# Patient Record
Sex: MALE | Race: WHITE | Employment: OTHER | URBAN - METROPOLITAN AREA
[De-identification: names, ages, dates, MRNs, and addresses within clinical notes are randomized per-mention and may not be internally consistent; named-entity substitution may affect disease eponyms.]

---

## 2021-05-16 ENCOUNTER — APPOINTMENT (OUTPATIENT)
Dept: NON INVASIVE DIAGNOSTICS | Age: 71
DRG: 287 | End: 2021-05-16
Attending: STUDENT IN AN ORGANIZED HEALTH CARE EDUCATION/TRAINING PROGRAM
Payer: MEDICARE

## 2021-05-16 ENCOUNTER — APPOINTMENT (OUTPATIENT)
Dept: GENERAL RADIOLOGY | Age: 71
DRG: 287 | End: 2021-05-16
Attending: EMERGENCY MEDICINE
Payer: MEDICARE

## 2021-05-16 ENCOUNTER — HOSPITAL ENCOUNTER (INPATIENT)
Age: 71
LOS: 1 days | Discharge: HOME | DRG: 287 | End: 2021-05-17
Attending: EMERGENCY MEDICINE | Admitting: STUDENT IN AN ORGANIZED HEALTH CARE EDUCATION/TRAINING PROGRAM
Payer: MEDICARE

## 2021-05-16 DIAGNOSIS — R57.0 CARDIOGENIC SHOCK (HCC): ICD-10-CM

## 2021-05-16 DIAGNOSIS — I48.91 ATRIAL FIBRILLATION WITH RVR (HCC): ICD-10-CM

## 2021-05-16 DIAGNOSIS — I21.3 ST ELEVATION MYOCARDIAL INFARCTION (STEMI), UNSPECIFIED ARTERY (HCC): ICD-10-CM

## 2021-05-16 DIAGNOSIS — I21.09 ST ELEVATION MYOCARDIAL INFARCTION (STEMI) OF ANTERIOR WALL (HCC): Primary | ICD-10-CM

## 2021-05-16 PROBLEM — I25.10 CAD (CORONARY ARTERY DISEASE): Status: ACTIVE | Noted: 2021-05-16

## 2021-05-16 LAB
ALBUMIN SERPL-MCNC: 3.6 G/DL (ref 3.5–5)
ALBUMIN/GLOB SERPL: 1.1 {RATIO} (ref 1.1–2.2)
ALP SERPL-CCNC: 72 U/L (ref 45–117)
ALT SERPL-CCNC: 33 U/L (ref 12–78)
ANION GAP SERPL CALC-SCNC: 8 MMOL/L (ref 5–15)
APTT PPP: 27.9 SEC (ref 22.1–31)
AST SERPL-CCNC: 22 U/L (ref 15–37)
ATRIAL RATE: 133 BPM
BASOPHILS # BLD: 0 K/UL (ref 0–0.1)
BASOPHILS NFR BLD: 0 % (ref 0–1)
BILIRUB SERPL-MCNC: 0.4 MG/DL (ref 0.2–1)
BNP SERPL-MCNC: 901 PG/ML
BUN SERPL-MCNC: 20 MG/DL (ref 6–20)
BUN/CREAT SERPL: 14 (ref 12–20)
CALCIUM SERPL-MCNC: 10 MG/DL (ref 8.5–10.1)
CALCULATED R AXIS, ECG10: 52 DEGREES
CALCULATED T AXIS, ECG11: 19 DEGREES
CHLORIDE SERPL-SCNC: 98 MMOL/L (ref 97–108)
CO2 SERPL-SCNC: 27 MMOL/L (ref 21–32)
COMMENT, HOLDF: NORMAL
CREAT SERPL-MCNC: 1.45 MG/DL (ref 0.7–1.3)
DIAGNOSIS, 93000: NORMAL
DIFFERENTIAL METHOD BLD: ABNORMAL
ECHO AO ROOT DIAM: 3.06 CM
ECHO AV AREA PEAK VELOCITY: 2.1 CM2
ECHO AV AREA/BSA PEAK VELOCITY: 1 CM2/M2
ECHO AV PEAK GRADIENT: 7.65 MMHG
ECHO AV PEAK VELOCITY: 138.27 CM/S
ECHO LA AREA 4C: 22.07 CM2
ECHO LA MAJOR AXIS: 3.65 CM
ECHO LA MINOR AXIS: 1.78 CM
ECHO LA VOL 2C: 69.13 ML (ref 18–58)
ECHO LA VOL 4C: 57.86 ML (ref 18–58)
ECHO LA VOL BP: 68.73 ML (ref 18–58)
ECHO LA VOL/BSA BIPLANE: 33.44 ML/M2 (ref 16–28)
ECHO LA VOLUME INDEX A2C: 33.63 ML/M2 (ref 16–28)
ECHO LA VOLUME INDEX A4C: 28.15 ML/M2 (ref 16–28)
ECHO LV E' LATERAL VELOCITY: 9.72 CM/S
ECHO LV E' SEPTAL VELOCITY: 7.71 CM/S
ECHO LV INTERNAL DIMENSION DIASTOLIC: 4.78 CM (ref 4.2–5.9)
ECHO LV INTERNAL DIMENSION SYSTOLIC: 3.24 CM
ECHO LV IVSD: 1.41 CM (ref 0.6–1)
ECHO LV IVSS: 2.2 CM
ECHO LV MASS 2D: 327.8 G (ref 88–224)
ECHO LV MASS INDEX 2D: 159.5 G/M2 (ref 49–115)
ECHO LV POSTERIOR WALL DIASTOLIC: 1.76 CM (ref 0.6–1)
ECHO LV POSTERIOR WALL SYSTOLIC: 1.79 CM
ECHO LVOT DIAM: 2.07 CM
ECHO LVOT PEAK GRADIENT: 2.99 MMHG
ECHO LVOT PEAK VELOCITY: 86.51 CM/S
ECHO MV A VELOCITY: 63.66 CM/S
ECHO MV E DECELERATION TIME (DT): 170.24 MS
ECHO MV E VELOCITY: 98.12 CM/S
ECHO MV E/A RATIO: 1.54
ECHO MV E/E' LATERAL: 10.09
ECHO MV E/E' RATIO (AVERAGED): 11.41
ECHO MV E/E' SEPTAL: 12.73
ECHO PV MAX VELOCITY: 106.31 CM/S
ECHO PV PEAK INSTANTANEOUS GRADIENT SYSTOLIC: 4.68 MMHG
ECHO TV REGURGITANT MAX VELOCITY: 200.8 CM/S
ECHO TV REGURGITANT PEAK GRADIENT: 16.13 MMHG
EOSINOPHIL # BLD: 0 K/UL (ref 0–0.4)
EOSINOPHIL NFR BLD: 0 % (ref 0–7)
ERYTHROCYTE [DISTWIDTH] IN BLOOD BY AUTOMATED COUNT: 13.2 % (ref 11.5–14.5)
GLOBULIN SER CALC-MCNC: 3.3 G/DL (ref 2–4)
GLUCOSE SERPL-MCNC: 141 MG/DL (ref 65–100)
HCT VFR BLD AUTO: 38.4 % (ref 36.6–50.3)
HGB BLD-MCNC: 12.9 G/DL (ref 12.1–17)
IMM GRANULOCYTES # BLD AUTO: 0.1 K/UL (ref 0–0.04)
IMM GRANULOCYTES NFR BLD AUTO: 1 % (ref 0–0.5)
LYMPHOCYTES # BLD: 1.9 K/UL (ref 0.8–3.5)
LYMPHOCYTES NFR BLD: 18 % (ref 12–49)
MAGNESIUM SERPL-MCNC: 1.9 MG/DL (ref 1.6–2.4)
MCH RBC QN AUTO: 35.4 PG (ref 26–34)
MCHC RBC AUTO-ENTMCNC: 33.6 G/DL (ref 30–36.5)
MCV RBC AUTO: 105.5 FL (ref 80–99)
MONOCYTES # BLD: 1 K/UL (ref 0–1)
MONOCYTES NFR BLD: 10 % (ref 5–13)
NEUTS SEG # BLD: 7.7 K/UL (ref 1.8–8)
NEUTS SEG NFR BLD: 71 % (ref 32–75)
NRBC # BLD: 0 K/UL (ref 0–0.01)
NRBC BLD-RTO: 0 PER 100 WBC
PLATELET # BLD AUTO: 231 K/UL (ref 150–400)
PMV BLD AUTO: 9.1 FL (ref 8.9–12.9)
POTASSIUM SERPL-SCNC: 3.2 MMOL/L (ref 3.5–5.1)
PROT SERPL-MCNC: 6.9 G/DL (ref 6.4–8.2)
Q-T INTERVAL, ECG07: 298 MS
QRS DURATION, ECG06: 162 MS
QTC CALCULATION (BEZET), ECG08: 497 MS
RBC # BLD AUTO: 3.64 M/UL (ref 4.1–5.7)
SAMPLES BEING HELD,HOLD: NORMAL
SODIUM SERPL-SCNC: 133 MMOL/L (ref 136–145)
THERAPEUTIC RANGE,PTTT: NORMAL SECS (ref 58–77)
TROPONIN I BLD-MCNC: <0.04 NG/ML (ref 0–0.08)
TROPONIN I SERPL-MCNC: 4.28 NG/ML
VENTRICULAR RATE, ECG03: 167 BPM
WBC # BLD AUTO: 10.7 K/UL (ref 4.1–11.1)

## 2021-05-16 PROCEDURE — 77030004549 HC CATH ANGI DX PRF MRTM -A: Performed by: STUDENT IN AN ORGANIZED HEALTH CARE EDUCATION/TRAINING PROGRAM

## 2021-05-16 PROCEDURE — 77030028837 HC SYR ANGI PWR INJ COEU -A: Performed by: STUDENT IN AN ORGANIZED HEALTH CARE EDUCATION/TRAINING PROGRAM

## 2021-05-16 PROCEDURE — 93458 L HRT ARTERY/VENTRICLE ANGIO: CPT | Performed by: STUDENT IN AN ORGANIZED HEALTH CARE EDUCATION/TRAINING PROGRAM

## 2021-05-16 PROCEDURE — 77030008543 HC TBNG MON PRSS MRTM -A: Performed by: STUDENT IN AN ORGANIZED HEALTH CARE EDUCATION/TRAINING PROGRAM

## 2021-05-16 PROCEDURE — 74011000250 HC RX REV CODE- 250: Performed by: STUDENT IN AN ORGANIZED HEALTH CARE EDUCATION/TRAINING PROGRAM

## 2021-05-16 PROCEDURE — 74011250637 HC RX REV CODE- 250/637: Performed by: STUDENT IN AN ORGANIZED HEALTH CARE EDUCATION/TRAINING PROGRAM

## 2021-05-16 PROCEDURE — 84484 ASSAY OF TROPONIN QUANT: CPT

## 2021-05-16 PROCEDURE — C1769 GUIDE WIRE: HCPCS | Performed by: STUDENT IN AN ORGANIZED HEALTH CARE EDUCATION/TRAINING PROGRAM

## 2021-05-16 PROCEDURE — 77030019698 HC SYR ANGI MDLON MRTM -A: Performed by: STUDENT IN AN ORGANIZED HEALTH CARE EDUCATION/TRAINING PROGRAM

## 2021-05-16 PROCEDURE — 74011000636 HC RX REV CODE- 636: Performed by: STUDENT IN AN ORGANIZED HEALTH CARE EDUCATION/TRAINING PROGRAM

## 2021-05-16 PROCEDURE — 4A023N7 MEASUREMENT OF CARDIAC SAMPLING AND PRESSURE, LEFT HEART, PERCUTANEOUS APPROACH: ICD-10-PCS | Performed by: STUDENT IN AN ORGANIZED HEALTH CARE EDUCATION/TRAINING PROGRAM

## 2021-05-16 PROCEDURE — 93005 ELECTROCARDIOGRAM TRACING: CPT

## 2021-05-16 PROCEDURE — 85025 COMPLETE CBC W/AUTO DIFF WBC: CPT

## 2021-05-16 PROCEDURE — 77030019569 HC BND COMPR RAD TERU -B: Performed by: STUDENT IN AN ORGANIZED HEALTH CARE EDUCATION/TRAINING PROGRAM

## 2021-05-16 PROCEDURE — 36415 COLL VENOUS BLD VENIPUNCTURE: CPT

## 2021-05-16 PROCEDURE — C1876 STENT, NON-COA/NON-COV W/DEL: HCPCS | Performed by: STUDENT IN AN ORGANIZED HEALTH CARE EDUCATION/TRAINING PROGRAM

## 2021-05-16 PROCEDURE — 93306 TTE W/DOPPLER COMPLETE: CPT

## 2021-05-16 PROCEDURE — 99152 MOD SED SAME PHYS/QHP 5/>YRS: CPT | Performed by: STUDENT IN AN ORGANIZED HEALTH CARE EDUCATION/TRAINING PROGRAM

## 2021-05-16 PROCEDURE — 85730 THROMBOPLASTIN TIME PARTIAL: CPT

## 2021-05-16 PROCEDURE — B2111ZZ FLUOROSCOPY OF MULTIPLE CORONARY ARTERIES USING LOW OSMOLAR CONTRAST: ICD-10-PCS | Performed by: STUDENT IN AN ORGANIZED HEALTH CARE EDUCATION/TRAINING PROGRAM

## 2021-05-16 PROCEDURE — 77030010221 HC SPLNT WR POS TELE -B: Performed by: STUDENT IN AN ORGANIZED HEALTH CARE EDUCATION/TRAINING PROGRAM

## 2021-05-16 PROCEDURE — 92960 CARDIOVERSION ELECTRIC EXT: CPT

## 2021-05-16 PROCEDURE — 99152 MOD SED SAME PHYS/QHP 5/>YRS: CPT

## 2021-05-16 PROCEDURE — 77030015766: Performed by: STUDENT IN AN ORGANIZED HEALTH CARE EDUCATION/TRAINING PROGRAM

## 2021-05-16 PROCEDURE — 96374 THER/PROPH/DIAG INJ IV PUSH: CPT

## 2021-05-16 PROCEDURE — 83735 ASSAY OF MAGNESIUM: CPT

## 2021-05-16 PROCEDURE — 74011000250 HC RX REV CODE- 250

## 2021-05-16 PROCEDURE — 74011250636 HC RX REV CODE- 250/636: Performed by: STUDENT IN AN ORGANIZED HEALTH CARE EDUCATION/TRAINING PROGRAM

## 2021-05-16 PROCEDURE — 80053 COMPREHEN METABOLIC PANEL: CPT

## 2021-05-16 PROCEDURE — 83880 ASSAY OF NATRIURETIC PEPTIDE: CPT

## 2021-05-16 PROCEDURE — 65660000000 HC RM CCU STEPDOWN

## 2021-05-16 PROCEDURE — 99285 EMERGENCY DEPT VISIT HI MDM: CPT

## 2021-05-16 PROCEDURE — 99153 MOD SED SAME PHYS/QHP EA: CPT

## 2021-05-16 PROCEDURE — 71045 X-RAY EXAM CHEST 1 VIEW: CPT

## 2021-05-16 RX ORDER — HEPARIN SODIUM 5000 [USP'U]/ML
4000 INJECTION, SOLUTION INTRAVENOUS; SUBCUTANEOUS AS NEEDED
Status: DISCONTINUED | OUTPATIENT
Start: 2021-05-16 | End: 2021-05-16

## 2021-05-16 RX ORDER — FENTANYL CITRATE 50 UG/ML
INJECTION, SOLUTION INTRAMUSCULAR; INTRAVENOUS
Status: DISPENSED
Start: 2021-05-16 | End: 2021-05-16

## 2021-05-16 RX ORDER — HEPARIN SODIUM 200 [USP'U]/100ML
INJECTION, SOLUTION INTRAVENOUS
Status: COMPLETED | OUTPATIENT
Start: 2021-05-16 | End: 2021-05-16

## 2021-05-16 RX ORDER — HEPARIN SODIUM 10000 [USP'U]/100ML
10-25 INJECTION, SOLUTION INTRAVENOUS
Status: DISCONTINUED | OUTPATIENT
Start: 2021-05-16 | End: 2021-05-16

## 2021-05-16 RX ORDER — KETAMINE HYDROCHLORIDE 10 MG/ML
INJECTION, SOLUTION INTRAMUSCULAR; INTRAVENOUS
Status: COMPLETED
Start: 2021-05-16 | End: 2021-05-16

## 2021-05-16 RX ORDER — SODIUM CHLORIDE 0.9 % (FLUSH) 0.9 %
5-40 SYRINGE (ML) INJECTION EVERY 8 HOURS
Status: DISCONTINUED | OUTPATIENT
Start: 2021-05-16 | End: 2021-05-17 | Stop reason: HOSPADM

## 2021-05-16 RX ORDER — METOPROLOL SUCCINATE 50 MG/1
100 TABLET, EXTENDED RELEASE ORAL
Status: DISCONTINUED | OUTPATIENT
Start: 2021-05-16 | End: 2021-05-17 | Stop reason: HOSPADM

## 2021-05-16 RX ORDER — FENTANYL CITRATE 50 UG/ML
INJECTION, SOLUTION INTRAMUSCULAR; INTRAVENOUS AS NEEDED
Status: DISCONTINUED | OUTPATIENT
Start: 2021-05-16 | End: 2021-05-16 | Stop reason: HOSPADM

## 2021-05-16 RX ORDER — ATORVASTATIN CALCIUM 20 MG/1
20 TABLET, FILM COATED ORAL DAILY
COMMUNITY

## 2021-05-16 RX ORDER — DILTIAZEM HYDROCHLORIDE 5 MG/ML
INJECTION INTRAVENOUS
Status: COMPLETED
Start: 2021-05-16 | End: 2021-05-16

## 2021-05-16 RX ORDER — VERAPAMIL HYDROCHLORIDE 2.5 MG/ML
INJECTION, SOLUTION INTRAVENOUS AS NEEDED
Status: DISCONTINUED | OUTPATIENT
Start: 2021-05-16 | End: 2021-05-16 | Stop reason: HOSPADM

## 2021-05-16 RX ORDER — SODIUM CHLORIDE 0.9 % (FLUSH) 0.9 %
5-40 SYRINGE (ML) INJECTION AS NEEDED
Status: DISCONTINUED | OUTPATIENT
Start: 2021-05-16 | End: 2021-05-17 | Stop reason: HOSPADM

## 2021-05-16 RX ORDER — ATORVASTATIN CALCIUM 10 MG/1
20 TABLET, FILM COATED ORAL
Status: DISCONTINUED | OUTPATIENT
Start: 2021-05-16 | End: 2021-05-17 | Stop reason: HOSPADM

## 2021-05-16 RX ORDER — AMLODIPINE BESYLATE 5 MG/1
5 TABLET ORAL DAILY
Status: DISCONTINUED | OUTPATIENT
Start: 2021-05-16 | End: 2021-05-16

## 2021-05-16 RX ORDER — AMLODIPINE BESYLATE 5 MG/1
5 TABLET ORAL
Status: DISCONTINUED | OUTPATIENT
Start: 2021-05-16 | End: 2021-05-17 | Stop reason: HOSPADM

## 2021-05-16 RX ORDER — KETAMINE HYDROCHLORIDE 50 MG/ML
50 INJECTION, SOLUTION INTRAMUSCULAR; INTRAVENOUS ONCE
Status: DISCONTINUED | OUTPATIENT
Start: 2021-05-16 | End: 2021-05-16

## 2021-05-16 RX ORDER — FENTANYL CITRATE 50 UG/ML
50 INJECTION, SOLUTION INTRAMUSCULAR; INTRAVENOUS ONCE
Status: DISCONTINUED | OUTPATIENT
Start: 2021-05-16 | End: 2021-05-16

## 2021-05-16 RX ORDER — HEPARIN SODIUM 5000 [USP'U]/ML
4000 INJECTION, SOLUTION INTRAVENOUS; SUBCUTANEOUS ONCE
Status: DISCONTINUED | OUTPATIENT
Start: 2021-05-16 | End: 2021-05-16

## 2021-05-16 RX ORDER — DILTIAZEM HYDROCHLORIDE 5 MG/ML
15 INJECTION INTRAVENOUS
Status: COMPLETED | OUTPATIENT
Start: 2021-05-16 | End: 2021-05-16

## 2021-05-16 RX ORDER — METOPROLOL SUCCINATE 50 MG/1
100 TABLET, EXTENDED RELEASE ORAL DAILY
Status: DISCONTINUED | OUTPATIENT
Start: 2021-05-16 | End: 2021-05-16

## 2021-05-16 RX ORDER — SODIUM CHLORIDE 9 MG/ML
75 INJECTION, SOLUTION INTRAVENOUS CONTINUOUS
Status: DISPENSED | OUTPATIENT
Start: 2021-05-16 | End: 2021-05-16

## 2021-05-16 RX ORDER — LIDOCAINE HYDROCHLORIDE 10 MG/ML
INJECTION, SOLUTION EPIDURAL; INFILTRATION; INTRACAUDAL; PERINEURAL AS NEEDED
Status: DISCONTINUED | OUTPATIENT
Start: 2021-05-16 | End: 2021-05-16 | Stop reason: HOSPADM

## 2021-05-16 RX ORDER — HEPARIN SODIUM 5000 [USP'U]/ML
2000 INJECTION, SOLUTION INTRAVENOUS; SUBCUTANEOUS AS NEEDED
Status: DISCONTINUED | OUTPATIENT
Start: 2021-05-16 | End: 2021-05-16

## 2021-05-16 RX ADMIN — DILTIAZEM HYDROCHLORIDE 15 MG: 5 INJECTION INTRAVENOUS at 02:03

## 2021-05-16 RX ADMIN — ATORVASTATIN CALCIUM 20 MG: 10 TABLET, FILM COATED ORAL at 22:22

## 2021-05-16 RX ADMIN — ATORVASTATIN CALCIUM 20 MG: 10 TABLET, FILM COATED ORAL at 05:02

## 2021-05-16 RX ADMIN — Medication 40 ML: at 10:00

## 2021-05-16 RX ADMIN — METOPROLOL SUCCINATE 100 MG: 50 TABLET, EXTENDED RELEASE ORAL at 17:01

## 2021-05-16 RX ADMIN — SODIUM CHLORIDE 75 ML/HR: 9 INJECTION, SOLUTION INTRAVENOUS at 04:32

## 2021-05-16 RX ADMIN — KETAMINE HYDROCHLORIDE 50 MG: 10 INJECTION, SOLUTION INTRAMUSCULAR; INTRAVENOUS at 01:57

## 2021-05-16 RX ADMIN — RIVAROXABAN 20 MG: 20 TABLET, FILM COATED ORAL at 17:01

## 2021-05-16 RX ADMIN — Medication 10 ML: at 22:22

## 2021-05-16 RX ADMIN — AMLODIPINE BESYLATE 5 MG: 5 TABLET ORAL at 17:01

## 2021-05-16 RX ADMIN — Medication 40 ML: at 14:00

## 2021-05-16 NOTE — PROGRESS NOTES
0700 Bedside and Verbal shift change report given to Alf Owusu (oncoming nurse) by Leeroy Gaines (offgoing nurse). Report included the following information SBAR, Kardex, ED Summary, Procedure Summary, Intake/Output, MAR, Recent Results and Cardiac Rhythm NSR.   0800 pt assessed per flowsheet, TR bans down, site CDI, removed and DSD applied. Reviewed plan of care  0920 pt up to chair, orthostatic BP negative. Breakfast provided and christy well. Labs collected previously and resulted. Trop 4.28, I have paged Dr Kash Menendez with this result and to verify schedule of pt's meds ( he takes them in the evening around 6- here scheduled daily at 9A)  1030 pt back to bed for echo at bedside  1200 pt reassessed per flowsheet, denies pain or SOB, cath site CDI. Pt amb in room with steady gait, christy well. 1230 TRANSFER - OUT REPORT:    Verbal report given to Yvette(name) on Ben Nova  being transferred to IVCU(unit) for routine progression of care       Report consisted of patients Situation, Background, Assessment and   Recommendations(SBAR). Information from the following report(s) SBAR, Kardex, ED Summary, Procedure Summary, Intake/Output, MAR, Recent Results and Cardiac Rhythm NSR was reviewed with the receiving nurse.     Lines:   Peripheral IV 05/16/21 Right Forearm (Active)   Site Assessment Clean, dry, & intact 05/16/21 0800   Phlebitis Assessment 0 05/16/21 0800   Infiltration Assessment 0 05/16/21 0800   Dressing Status Clean, dry, & intact 05/16/21 0800   Dressing Type Transparent;Tape 05/16/21 0800   Hub Color/Line Status Green;Flushed;Capped 05/16/21 0800   Action Taken Open ports on tubing capped 05/16/21 0800   Alcohol Cap Used Yes 05/16/21 0800       Peripheral IV 05/16/21 Left Antecubital (Active)   Site Assessment Clean, dry, & intact 05/16/21 0800   Phlebitis Assessment 0 05/16/21 0800   Infiltration Assessment 0 05/16/21 0800   Dressing Status Clean, dry, & intact 05/16/21 0800   Dressing Type Transparent;Tape 05/16/21 0800   Hub Color/Line Status Pink;Capped 05/16/21 0800   Action Taken Open ports on tubing capped 05/16/21 0800   Alcohol Cap Used Yes 05/16/21 0800        Opportunity for questions and clarification was provided. Patient transported with:   Monitor  Registered Nurse   Home meds rescheduled to pt's normal schedule.

## 2021-05-16 NOTE — ED PROVIDER NOTES
EMERGENCY DEPARTMENT HISTORY AND PHYSICAL EXAM    Please note that this dictation was completed with [employer], the computer voice recognition software. Quite often unanticipated grammatical, syntax, homophones, and other interpretive errors are inadvertently transcribed by the computer software. Please disregard these errors. Please excuse any errors that have escaped final proofreading. Date: 5/16/2021  Patient Name: Molli Barthel  Patient Age and Sex: 79 y.o. male    History of Presenting Illness     Chief Complaint   Patient presents with    Irregular Heart Beat    Chest Pain       History Provided By: Patient and patient' son    HPI: Molli Barthel, is a 79 y.o. male whose medical history is noted below and includes cad, afib, presents to the ED via ambulance with crushing substernal cp that started 30-45 min prior to ED arrival. Pain started at rest, crushing, 8-10/10, constant. Not radiating. Associated with mild sob. Received 325mg asa and nitro by ems, nitro improved the cp only slightly. En route and on arrival to the ED, patient's HR is in the 160s, BP normal.  Denies abd pain, nausea, vomiting, cough, fever or chills. Patient immediately placed on monitor and on pads due to severity of cp, diaphoresis and concern for impending hemodynamic instability. Pt denies any other alleviating or exacerbating factors. No other associated signs or symptoms. There are no other complaints, changes or physical findings at this time.      PCP: Tammie Hugo MD    Past History   All documented elements of the [address] reviewed and verified by me. -Luciana Saint, MD    Past Medical History:  Past Medical History:   Diagnosis Date    Arthritis     Chronic pain     chronic back pain    High cholesterol     Hypertension     Obesity        Past Surgical History:  Past Surgical History:   Procedure Laterality Date    HX GI  2007    colonscopy    HX ORTHOPAEDIC  2008    lumbar dorantes/fusion    NEUROLOGICAL PROCEDURE UNLISTED  2002    brain cyst-3rd ventricle    SHOULDER SURG PROC UNLISTED  1960s    shoulder separation       Family History:  Family History   Problem Relation Age of Onset   [facility] Hypertension Mother     Stroke Mother         TIA X4    Cancer Father         prostate ca    Hypertension Father        Social History:  Social History     Tobacco Use    Smoking status: Current Every Day Smoker     Packs/day: 0.50     Years: 40.00     Pack years: 20.00   Substance Use Topics    Alcohol use: Yes     Comment: occasional    Drug use: Yes     Types: Prescription       Allergies:  No Known Allergies    Review of Systems   All other systems reviewed and negative    Review of Systems   Constitutional: Negative for chills and fever. Eyes: Negative. Respiratory: Positive for shortness of breath. Negative for cough and wheezing. Cardiovascular: Positive for chest pain and palpitations. Gastrointestinal: Negative for abdominal pain, blood in stool, nausea and vomiting. Genitourinary: Negative for flank pain and hematuria. Musculoskeletal: Negative for back pain and neck pain. Skin: Positive for pallor. Negative for rash. Neurological: Positive for light-headedness. Negative for seizures and syncope. Hematological: Negative for adenopathy. Bruises/bleeds easily. Psychiatric/Behavioral: Positive for agitation. Negative for confusion. All other systems reviewed and are negative. Physical Exam   Reviewed patients vital signs and nursing note    Physical Exam  Vitals signs and nursing note reviewed. Constitutional:       General: He is awake. He is in acute distress. Appearance: He is obese. He is ill-appearing and diaphoretic. HENT:      Head: Normocephalic and atraumatic. Eyes:      Extraocular Movements: Extraocular movements intact. Pupils: Pupils are equal, round, and reactive to light. Neck:      Musculoskeletal: Neck supple. Vascular: No JVD.       Trachea: No tracheal deviation. Cardiovascular:      Rate and Rhythm: Regular rhythm. Tachycardia present. Pulses:           Radial pulses are 2+ on the right side and 2+ on the left side. Heart sounds: Normal heart sounds. Pulmonary:      Effort: Pulmonary effort is normal. No accessory muscle usage. Breath sounds: Normal breath sounds. Chest:      Chest wall: No deformity, tenderness or crepitus. Abdominal:      Palpations: Abdomen is soft. There is no mass. Tenderness: There is no abdominal tenderness. Musculoskeletal: Normal range of motion. Right lower leg: He exhibits no tenderness. No edema. Left lower leg: He exhibits no tenderness. No edema. Skin:     Capillary Refill: Capillary refill takes 2 to 3 seconds. Coloration: Skin is pale. Skin is not cyanotic. Findings: No erythema or rash. Neurological:      General: No focal deficit present. Mental Status: He is oriented to person, place, and time. Psychiatric:         Mood and Affect: Mood is anxious. Behavior: Behavior normal.         Diagnostic Study Results     Labs - I have personally reviewed and interpreted all laboratory results. Janeth Nieto MD, MSc  Recent Results (from the past 24 hour(s))   EKG, 12 LEAD, INITIAL    Collection Time: 05/16/21  1:43 AM   Result Value Ref Range    Ventricular Rate 167 BPM    Atrial Rate 133 BPM    QRS Duration 162 ms    Q-T Interval 298 ms    QTC Calculation (Bezet) 497 ms    Calculated R Axis 52 degrees    Calculated T Axis 19 degrees    Diagnosis       Wide QRS tachycardia with premature supraventricular complexes and with   frequent premature ventricular complexes  Nonspecific intraventricular block  When compared with ECG of 20-OCT-2008 16:53,  Wide QRS tachycardia has replaced Sinus rhythm  Vent.  rate has increased BY  83 BPM     POC TROPONIN-I    Collection Time: 05/16/21  1:51 AM   Result Value Ref Range    Troponin-I (POC) <0.04 0.00 - 0.08 ng/mL   CBC WITH AUTOMATED DIFF    Collection Time: 05/16/21  1:52 AM   Result Value Ref Range    WBC 10.7 4.1 - 11.1 K/uL    RBC 3.64 (L) 4.10 - 5.70 M/uL    HGB 12.9 12.1 - 17.0 g/dL    HCT 38.4 36.6 - 50.3 %    .5 (H) 80.0 - 99.0 FL    MCH 35.4 (H) 26.0 - 34.0 PG    MCHC 33.6 30.0 - 36.5 g/dL    RDW 13.2 11.5 - 14.5 %    PLATELET 783 309 - 045 K/uL    MPV 9.1 8.9 - 12.9 FL    NRBC 0.0 0  WBC    ABSOLUTE NRBC 0.00 0.00 - 0.01 K/uL    NEUTROPHILS 71 32 - 75 %    LYMPHOCYTES 18 12 - 49 %    MONOCYTES 10 5 - 13 %    EOSINOPHILS 0 0 - 7 %    BASOPHILS 0 0 - 1 %    IMMATURE GRANULOCYTES 1 (H) 0.0 - 0.5 %    ABS. NEUTROPHILS 7.7 1.8 - 8.0 K/UL    ABS. LYMPHOCYTES 1.9 0.8 - 3.5 K/UL    ABS. MONOCYTES 1.0 0.0 - 1.0 K/UL    ABS. EOSINOPHILS 0.0 0.0 - 0.4 K/UL    ABS. BASOPHILS 0.0 0.0 - 0.1 K/UL    ABS. IMM. GRANS. 0.1 (H) 0.00 - 0.04 K/UL    DF AUTOMATED     METABOLIC PANEL, COMPREHENSIVE    Collection Time: 05/16/21  1:52 AM   Result Value Ref Range    Sodium 133 (L) 136 - 145 mmol/L    Potassium 3.2 (L) 3.5 - 5.1 mmol/L    Chloride 98 97 - 108 mmol/L    CO2 27 21 - 32 mmol/L    Anion gap 8 5 - 15 mmol/L    Glucose 141 (H) 65 - 100 mg/dL    BUN 20 6 - 20 MG/DL    Creatinine 1.45 (H) 0.70 - 1.30 MG/DL    BUN/Creatinine ratio 14 12 - 20      GFR est AA 58 (L) >60 ml/min/1.73m2    GFR est non-AA 48 (L) >60 ml/min/1.73m2    Calcium 10.0 8.5 - 10.1 MG/DL    Bilirubin, total 0.4 0.2 - 1.0 MG/DL    ALT (SGPT) 33 12 - 78 U/L    AST (SGOT) 22 15 - 37 U/L    Alk.  phosphatase 72 45 - 117 U/L    Protein, total 6.9 6.4 - 8.2 g/dL    Albumin 3.6 3.5 - 5.0 g/dL    Globulin 3.3 2.0 - 4.0 g/dL    A-G Ratio 1.1 1.1 - 2.2     NT-PRO BNP    Collection Time: 05/16/21  1:52 AM   Result Value Ref Range    NT pro- (H) <125 PG/ML   MAGNESIUM    Collection Time: 05/16/21  1:52 AM   Result Value Ref Range    Magnesium 1.9 1.6 - 2.4 mg/dL   SAMPLES BEING HELD    Collection Time: 05/16/21  1:54 AM   Result Value Ref Range    SAMPLES BEING HELD RD     COMMENT        Add-on orders for these samples will be processed based on acceptable specimen integrity and analyte stability, which may vary by analyte. PTT    Collection Time: 05/16/21  2:41 AM   Result Value Ref Range    aPTT 27.9 22.1 - 31.0 sec    aPTT, therapeutic range     58.0 - 77.0 SECS   TROPONIN I    Collection Time: 05/16/21  8:10 AM   Result Value Ref Range    Troponin-I, Qt. 4.28 (H) <0.05 ng/mL       Radiologic Studies - I have personally reviewed and interpreted all imaging studies and agree with radiology interpretation and report. - Mohini Salgado MD, MSc  XR CHEST PORT   Final Result   No acute changes. Medical Decision Making   I am the first provider for this patient. Records Reviewed: I reviewed our electronic medical record system for any past medical records that were available that may contribute to the patient's current condition, including their PMH, surgical history, social and family history. Reviewed the nursing notes and vital signs from today's visit. Nursing notes will be reviewed as they become available in realtime while the pt has been in the ED. In addition, I read most recent discharge summaries, if available and reviewed prior ECGs or imaging studies for comparison purposes. Mohini Salgado MD Msc    Vital Signs-Reviewed the patient's vital signs. ECG interpretation 1:43am: wide complex irregular tachycardia, rate 167, multiple PVCs, nonspecific ST changes concerning for underlying ischemia. ecg c/w afib with rvr and underlying intraventricular block. No prior ecg available. This ECG has been viewed and interpreted by me personally. Mohini Salgado MD, Msc  ECG interpretation 2:00am: sinus rhythm, rate 83, PACs, ST elevations in aVR and v1 with aVR>V1, reciprocal depressions in v3-v6. Concerning for LAD lesion and c/w STEMI. This ECG has been viewed and interpreted by me personally.  Mohini Salgado MD, Msc    Provider Notes (Medical Decision Making):     74NI male with history of CAD and atrial fibrillation presents via ambulance with crushing substernal cp, 10/10, started within the hour prior to arrival and while patient was at rest.  On arrival to the ED, patient was in unstable atrial fibrillation with rvr. Rates in 160s, patient diaphoretic, persistent cp despite having nitro and 325mg asa prior to arrival, normotensive initially. Pads placed, attempted diltiazem 15mgx1 without improvement. Patient shortly after deteriorated further clinically, became diaphoretic, hypotensive with sbp in 80s, poor cap refill. Concerning for cardiogenic shock. Promptly cardioverted using ketamine IV for sedation prior to cardioversion. Cardioversion with 200J was successful on first attempt. ECG post cardioversion obtained immediately and concerning for STEMI, specifically LAD lesion given the st elevations in avR with reciprocal depressions. Cath lab activated. Discussed case with Dr. Landry Speaker from cardiology, reviewed ecg findings and patient's presenting history as well as what transpired in the ED. Agreement on cath lab activation. Heparin started. Pain persists but is improving. Plan is to get him to cath lab as soon as possible. Hemodynamically, patient remained stable after cardioversion and up until transfer to cath lab. Of note, I interviewed the patient's son after the patient was stabilized and in NSR. According to the son, the patient had been heavily drinking this evening. His wife  a few months ago and he has been drinking a significant amount ever since. He was at [facility] about 4 or 5 years ago with similar cp, was found to be in afib with rvr there also. Son states that he believes a cath at that time was done and showed no significant stenosis. Procedure Note - External Cardioversion:   1:55 AM  Performed by alejandrina .    Cardioversion was indicated for a rhythm of unstable tachycardia and performed 1 times with a maximum delivered energy of 200 joules,  biphasic. Adequate procedural sedation was attained, see moderate sedation record. Patient's rhythm was normal sinus rhythm at the end of the procedure. The procedure took 1-15 minutes, and pt tolerated well. Procedure Note - Procedural Sedation 1:50am:     Indication:  Procedural sedation is required to perform the following procedure:  External cardioversion for unstable atrial fibrillation    Informed Consent:  The reason for the procedure as well as the risks, benefits, and alternatives to the procedure have been explained to the patient and He verbally consents to the procedure. Written consent not obtained due to emergent nature of procedure. Anesthesia Risks: The ASA score is ASA 3 - Severe systemic disease but compensated . The patient is having all vital signs monitored by an RN as well as pulse oximetry. Mallampati Score Reference: The patient has a patent airway with a Mallampati Classification Score of III (soft palate, base of uvula visible). Pre-Procedure Sedation Assessment:  Sedation Start Time: 1:50AM  Time Out was performed to confirm patient identity, laterality and indication prior to procedural sedation. Post Procedure Sedation Assessment:   Sedation End Time: 2:13am    The patient was sedated with a total of 50mg Ketamine via IV. Reversal agents and resuscitation equipment were at the bedside. The cardioversion was done and the patient tolerated the procedure well with no complications. Reversal agents were not used. Patient tolerated procedure well. Complications: None     Jeni Hanson MD    Procedure Note - Bedside Ultrasound:  1:41 AM  Performed by: alejandrina  Echo performed at beside, showing no pericardial effusion, rv>lv, EF not severely reduced, aortic outflow tract normal diameter. The procedure took 1-15 minutes, and pt tolerated well. ED Course:   Initial assessment performed.  The patients presenting problems have been discussed, and they are in agreement with the care plan formulated and outlined with them. I have encouraged them to ask questions as they arise throughout their visit. Consult Note:  Stephenie Rodgers MD spoke with  Dr. Rosezella Carrel (cardiology),   Discussed pt's hx, physical exam and available diagnostic and imaging results. Reviewed care plans. Agree with management and plan thus far. Dispo: to cath lab    DISPOSITION: ADMIT  Patient is being admitted to the hospital.  Their test results and reasons for admission have been discussed. The patient and/or available family express agreement with and understanding of the need to be admitted and their admission diagnosis. Thank you for resuming the care of this patient. Please don't hesitate to contact me in the emergency department if you  have any additional questions. Stephenie Rodgers MD, MSc    CRITICAL CARE NOTE :    IMPENDING DETERIORATION -Cardiovascular  ASSOCIATED RISK FACTORS - Hypotension, Shock and Dysrhythmia  MANAGEMENT- Bedside Assessment and Supervision of Care  INTERPRETATION -  Xrays, ECG, Blood Pressure and Cardiac Output Measures   INTERVENTIONS - hemodynamic mngmt and cardioversion  CASE REVIEW - Medical Sub-Specialist, Nursing and Family  TREATMENT RESPONSE -Improved  PERFORMED BY - Self and Resident    NOTES   :    I have spent 50 minutes of critical care time involved in lab review, consultations with specialist, family decision- making, bedside attention and documentation. During this entire length of time I was immediately available to the patient . Critical Care: The reason for providing this level of medical care for this critically ill patient was due to a critical illness that impaired one or more vital organ systems, such that there was a high probability of imminent or life threatening deterioration in the patient's condition.  This care involved high complexity decision making to assess, manipulate, and support vital system functions, to treat this degree of vital organ system failure, and to prevent further life threatening deterioration of the patients condition. Janeth Nieto MD      I, Lisa Guzman MD, am the attending of record for this patient encounter. Diagnosis     Clinical Impression:   1. ST elevation myocardial infarction (STEMI) of anterior wall ([address]    2. ST elevation myocardial infarction (STEMI), unspecified artery ([address]    3. Atrial fibrillation with RVR ([address]    4. Cardiogenic shock ([address]        Attestation:  I personally performed the services described in this documentation on this date 5/16/2021 for patient Jina Payton.   Janeth Nieto MD

## 2021-05-16 NOTE — Clinical Note
TRANSFER - OUT REPORT:     Verbal report given to: Artem Johnson RN. Report consisted of patient's Situation, Background, Assessment and   Recommendations(SBAR). Opportunity for questions and clarification was provided. Patient transported with a Registered Nurse. Patient transported to: CCU, [address] [place] [address].

## 2021-05-16 NOTE — PROGRESS NOTES
03: 20-TRANSFER - IN REPORT:    Verbal report received from Cath Lab RN(name) on Yoan Laser  being received from Cath Lab for routine post - op      Report consisted of patients Situation, Background, Assessment and   Recommendations(SBAR). Information from the following report(s) SBAR, Kardex, Intake/Output, MAR and Recent Results was reviewed with the receiving nurse. Opportunity for questions and clarification was provided. Assessment completed upon patients arrival to unit and care assumed. 04:00(arrival), Pt is A&O(x4), SR on tele, BP stable, pulses +, Lungs clear, cardiac diet, voiding into urinal , Right Radial  TR band over site( with no stent placed clean cath), will start to remove air 2 hours post arrival @06:00, PIV(x2).  06:00-Started to deflat TR band, no complications, will continue  06:25-TR band deflated, but left in place to secure site briefly to ensure no bleeding. 07:00-Bedside and Verbal shift change report given to RODRÍGUEZ Waddell (oncoming nurse) by Pao Robles (offgoing nurse). Report included the following information SBAR, Kardex, Intake/Output, MAR and Recent Results.

## 2021-05-16 NOTE — ED NOTES
Pt arrives EMS from home. Per EMS, pt started feeling a crushing cp about 30-45 PTA. Pt took  prior to EMS arrival, en transport EMS gave 1 Nitro. Pt arrives with 10/10 cp. Pt is a/o x4, and verbal.      Per son, pt drank about 10 drinks bourbon at home PTA. Pt has hx of afib with RVR and was shocked \"about 4 years ago\". [id number] Ayers MD at bedside at this time for evaluation. 0148: Cardizem 15 mg given at this time    0157: Ketamine 50 mg given at this time. 0210Sarbjit Ayers MD on phone with cardiology at this time. 1112: resident at bedside at this time for reevaluation. Pt has become verbal again at this time from ketamine. [id number] Ayers MD is at bedside for re-eval.     0228: pt is alert at this time and verbal     0230: cath lab at bedside at this time.      [id number] Branch MD at bedside at this time for re-eval.

## 2021-05-16 NOTE — H&P
Admission      [facility] Admission H&P    Date of consult:  21  Date of admission: 2021  Primary Cardiologist:   Physician Requesting consult: Afib, chest pain, EKG changes     ______________________________________________________________________________    Assessment:    1. Atrial fibrillation with RVR s/p DCCV. Paroxysmal atrial fibrillation, s/p DCCV in 2018  2. EKG changes and Chest pain   3. Non obstructive CAD   4. HTN  5. HLP  6. Heavy alcohol use following death of wife for last 2 months   7. Orthopedic problems           Recommendations / Plan:    Has chest pain and EKG changes, underwent urgent LHC showed non obstructive CAD   Remains in NSR after DCCV, has episode of Afib in 2018 and then again in the setting of heavy alcohol use due to his wife's death   Will cont to monitor and if has recurrent Afib will start anti arrhythmic   EP evaluation, can be done as OP   Resume metoprolol   Cont xarelto   Cont Statin   Resume Amlodipine (At home on amlodipine-benazepril )   Check lipid panels  Echocardiogram   Monitor for withdrawal        ________________________________________________________________________________    CC / Reason for consult:  Chest pain, EKG changes, Afib with RVR     History of the presenting illness:    Aidan Daugherty is a 79 y.o. male with pmh of paroxysmal Afib on xarelto presented to ER with sudden onset of chest pain, chest pressure, severe, started at night, radiation to left shoulder, associated with palpitations, diaphoresis, shortness of breath. He called EMS. Has Afib with RVR with diffuse ST depression. He was emergently cardioverted in ER. After that he was in NSR but continue to have significant chest pain with diffuse ST depression and elevation in avR. He underwent LHC and showed non obstructive CAD. He has similar presentation in 2018 for Afib and had DCCV and then LHC showed non obst CAD.    His wife  from breast CA and has been depressed and drinking heavily. Problem List:    1. Paroxysmal atrial fibrillation 2018 s/p DC Cardioversion  2. Cardiac cath 2018 showed mild coronary irregularities, but no significant CAD  3. Echo 2018 showed EF 70-75%, mild LVH, no significant valvular abnormality  4. Hypertension  5. Hyperlipidemia  6. Hx tobacco use. 7. Hypokalemia  8. Multiple orthopedic problems    His blood pressure is up today. He is under a lot of stress,  His wife has terminal breast cancer. Not sleeping well at night. No recent palpitations. CARDIAC HISTORY  CAD:  1 NSTEMI [Cath] - 2018     ARRHYTHMIA:  1 A Fib: RVR [DCCV] - 1/3/2018     RISK FACTORS:  1 Hypertension   2 Dyslipidemia       CARDIOVASCULAR PROCEDURES  Procedure Date Results   Cath 2018 Normal EF, Mild coronary irregulartities but no significant coronary obstruction   DCCV 2018 Initial Rhythm A Fib, Final Rhythm Sinus, Max Joules 200, 1 Shock, Done in the er   EKG 2020 Sinus Rhythm, Normal ECG       ACTIVE ALLERGIES:  Ingredient Reaction Comment   NO KNOWN ALLERGIES       None      PAST MEDICAL/SURGICAL HISTORY  (Detailed)    Disease/disorder Onset Date Surgical History Date Comments     Hip replacement     Acute non-ST segment elevation myocardial infarcti 2018      Hyperlipidemia       Hypertension         Family History  (Detailed)  Relationship Family Member Name  Age at Death Condition Onset Age Cause of Death       Family history of No family hx of CAD  N     SOCIAL HISTORY  (Detailed)  Preferred language is [place]. MARITAL STATUS/FAMILY/SOCIAL SUPPORT  Currently . Smoking status: Former smoker. SMOKING STATUS  Use Status Type Smoking Status Usage Per Day Years Used Total Pack Years   yes Cigarette Former smoker 10 Cigarettes           ALCOHOL  There is no history of alcohol use. CAFFEINE  The patient uses caffeine: coffee.        FINAL MEDICATION LIST  Medication Sig Desc Non-VCS   amlodipine 5 mg-benazepril 20 mg capsule TAKE 1 CAPSULE EVERY DAY N   atorvastatin 20 mg tablet take 1 tablet by oral route  every day N   Flonase Allergy Relief 50 mcg/actuation nasal spray,suspension spray 1 - 2 spray by intranasal route  every day in each nostril as needed Y   metoprolol succinate  mg tablet,extended release 24 hr take 1 tablet by oral route  every day N   potassium gluconate 595 mg (99 mg) tablet TAKE ONE 595MG TB ORAL  ONCE A DAY Y   Vitamin D3 4,000 unit capsule take 1 by Oral route once Y   Xarelto 20 mg tablet TAKE 1 TABLET ONE TIME DAILY WITH DINNER N         Past Medical History:   Diagnosis Date    Arthritis     Chronic pain     chronic back pain    High cholesterol     Hypertension     Obesity        Prior to Admission medications    Medication Sig Start Date End Date Taking? Authorizing Provider   oxycodone-acetaminophen (PERCOCET) 5-325 mg per tablet Take 1-2 Tabs by mouth every four (4) hours as needed for Pain. 2/18/11   Rochelle Blue PA   simvastatin (ZOCOR) 40 mg tablet Take 40 mg by mouth nightly. Provider, Historical   lisinopril-hydrochlorothiazide (PRINZIDE, ZESTORETIC) 20-12.5 mg per tablet Take  by mouth nightly. Provider, Historical   aspirin 81 mg tablet Take 81 mg by mouth daily.     Provider, Historical       Family History   Problem Relation Age of Onset    Hypertension Mother     Stroke Mother         TIA X4    Cancer Father         prostate ca    Hypertension Father        Social History     Socioeconomic History    Marital status:      Spouse name: Not on file    Number of children: Not on file    Years of education: Not on file    Highest education level: Not on file   Occupational History    Not on file   Social Needs    Financial resource strain: Not on file    Food insecurity     Worry: Not on file     Inability: Not on file    Transportation needs     Medical: Not on file     Non-medical: Not on file   Tobacco Use    Smoking status: Current Every Day Smoker     Packs/day: 0.50     Years: 40.00     Pack years: 20.00   Substance and Sexual Activity    Alcohol use: Yes     Comment: occasional    Drug use: Yes     Types: Prescription    Sexual activity: Not on file   Lifestyle    Physical activity     Days per week: Not on file     Minutes per session: Not on file    Stress: Not on file   Relationships    Social connections     Talks on phone: Not on file     Gets together: Not on file     Attends Taoist service: Not on file     Active member of club or organization: Not on file     Attends meetings of clubs or organizations: Not on file     Relationship status: Not on file    Intimate partner violence     Fear of current or ex partner: Not on file     Emotionally abused: Not on file     Physically abused: Not on file     Forced sexual activity: Not on file   Other Topics Concern    Not on file   Social History Narrative    Not on file          ROS     12 system review performed and negative for all 12 systems except noted in HPI     Visit Vitals  BP (!) 136/96   Pulse 85   Temp 97.6 °F (36.4 °C)   Resp 17   Ht 5' 7\" (1.702 m)   Wt 94.3 kg (207 lb 14.3 oz)   SpO2 99%   BMI 32.56 kg/m²        Examination:     General: Alert + Oriented x3, no acute distress   HEENT: Normocephalic aromatic, MMM   Neck: Supple, JVP- not well appreciated   RS: Non labored, clear   CVS: Regular rate and rhythm, S1S2, no murmur   Abd: Soft, non tender, non distended   Lower extremity: Warm to touch, Edema- None   Skin: Warm and dry, No significant bruises or rash   CNS: Oriented x3, no focal neuro deficit       Lab review:  BMP:   Lab Results   Component Value Date/Time     (L) 05/16/2021 01:52 AM    K 3.2 (L) 05/16/2021 01:52 AM    CL 98 05/16/2021 01:52 AM    CO2 27 05/16/2021 01:52 AM    AGAP 8 05/16/2021 01:52 AM     (H) 05/16/2021 01:52 AM    BUN 20 05/16/2021 01:52 AM    CREA 1.45 (H) 05/16/2021 01:52 AM    GFRAA 58 (L) 05/16/2021 01:52 AM    GFRNA 48 (L) 05/16/2021 01:52 AM        CBC:  Lab Results   Component Value Date/Time    WBC 10.7 05/16/2021 01:52 AM    Hemoglobin (POC) 13.4 02/15/2011 12:10 PM    HGB 12.9 05/16/2021 01:52 AM    HCT 38.4 05/16/2021 01:52 AM    PLATELET 845 75/06/3742 01:52 AM    .5 (H) 05/16/2021 01:52 AM       All Cardiac Markers in the last 24 hours:    Lab Results   Component Value Date/Time    TNIPOC <0.04 05/16/2021 01:51 AM    BNPNT 901 (H) 05/16/2021 01:52 AM       Data review:  EKG tracing personally reviewed:     Echocardiogram:    Other cardiac testing:    Other imaging:        Signed:  Kecia Thomason MD  Interventional Cardiology  5/16/2021

## 2021-05-17 VITALS
RESPIRATION RATE: 17 BRPM | HEIGHT: 67 IN | OXYGEN SATURATION: 100 % | BODY MASS INDEX: 32.63 KG/M2 | WEIGHT: 207.89 LBS | TEMPERATURE: 98 F | SYSTOLIC BLOOD PRESSURE: 155 MMHG | DIASTOLIC BLOOD PRESSURE: 89 MMHG | HEART RATE: 71 BPM

## 2021-05-17 LAB
ANION GAP SERPL CALC-SCNC: 7 MMOL/L (ref 5–15)
BUN SERPL-MCNC: 25 MG/DL (ref 6–20)
BUN/CREAT SERPL: 21 (ref 12–20)
CALCIUM SERPL-MCNC: 9.4 MG/DL (ref 8.5–10.1)
CHLORIDE SERPL-SCNC: 103 MMOL/L (ref 97–108)
CHOLEST SERPL-MCNC: 158 MG/DL
CO2 SERPL-SCNC: 26 MMOL/L (ref 21–32)
CREAT SERPL-MCNC: 1.19 MG/DL (ref 0.7–1.3)
ERYTHROCYTE [DISTWIDTH] IN BLOOD BY AUTOMATED COUNT: 13.7 % (ref 11.5–14.5)
GLUCOSE SERPL-MCNC: 97 MG/DL (ref 65–100)
HCT VFR BLD AUTO: 37.4 % (ref 36.6–50.3)
HDLC SERPL-MCNC: 108 MG/DL
HDLC SERPL: 1.5 {RATIO} (ref 0–5)
HGB BLD-MCNC: 12.2 G/DL (ref 12.1–17)
LDLC SERPL CALC-MCNC: 30.4 MG/DL (ref 0–100)
MCH RBC QN AUTO: 34.9 PG (ref 26–34)
MCHC RBC AUTO-ENTMCNC: 32.6 G/DL (ref 30–36.5)
MCV RBC AUTO: 106.9 FL (ref 80–99)
NRBC # BLD: 0 K/UL (ref 0–0.01)
NRBC BLD-RTO: 0 PER 100 WBC
PLATELET # BLD AUTO: 190 K/UL (ref 150–400)
PMV BLD AUTO: 9 FL (ref 8.9–12.9)
POTASSIUM SERPL-SCNC: 3.9 MMOL/L (ref 3.5–5.1)
RBC # BLD AUTO: 3.5 M/UL (ref 4.1–5.7)
SODIUM SERPL-SCNC: 136 MMOL/L (ref 136–145)
TRIGL SERPL-MCNC: 98 MG/DL (ref ?–150)
TROPONIN I SERPL-MCNC: 2.71 NG/ML
VLDLC SERPL CALC-MCNC: 19.6 MG/DL
WBC # BLD AUTO: 7.8 K/UL (ref 4.1–11.1)

## 2021-05-17 PROCEDURE — 85027 COMPLETE CBC AUTOMATED: CPT

## 2021-05-17 PROCEDURE — 80061 LIPID PANEL: CPT

## 2021-05-17 PROCEDURE — 84484 ASSAY OF TROPONIN QUANT: CPT

## 2021-05-17 PROCEDURE — 36415 COLL VENOUS BLD VENIPUNCTURE: CPT

## 2021-05-17 PROCEDURE — 80048 BASIC METABOLIC PNL TOTAL CA: CPT

## 2021-05-17 RX ORDER — METOPROLOL SUCCINATE 100 MG/1
100 TABLET, EXTENDED RELEASE ORAL
COMMUNITY

## 2021-05-17 NOTE — DISCHARGE INSTRUCTIONS
[address], [address]   [phone]  [address]    [email or web address]    Patient Discharge Instructions    Jan Goodwin / [id number] : 1950    Admitted 2021 Discharged: 2021       · It is important that you take the medication exactly as they are prescribed. · Keep your medication in the bottles provided by the pharmacist and keep a list of the medication names, dosages, and times to be taken in your wallet. · Do not take other medications without consulting your doctor. BRING ALL OF YOUR MEDICINES TO YOUR OFFICE VISIT with Abdiel Denise MD or my nurse practitioner. Follow-up with Abdiel Denise MD or my nurse practitioner in 2 weeks. Cardiac Catheterization  Discharge Instructions    Transradial Catheterization Discharge Instructions (WRIST)    Discharge instructions: Your radial artery in your wrist was used for your cardiac catheterization. This site may be slightly bruised and sore following your procedure. Expect mild tingling or the hand and tenderness at the puncture site for up to 3 days. Excess movement of the wrist used should be avoided for the next 24-48 hours. 1. No lifting over 2 pounds (approximately a ½ gallon of milk) with this arm for 24 hours. 2. Keep the site of the procedure covered with a bandage for 24 hours. 3. You may shower the day after your procedure. Do not take a tub bath or submerge the puncture site in water for 48 hours. 4. No heavy impact activity/lifting > 10 pounds for 1 week. If bleeding of the wrist occurs at home:   If the site on your wrist where you had the catheterization procedure begins to bleed, do not panic. 1. Place 1 or 2 fingers over the puncture site and hold pressure to stop the bleeding. You may be able to feel your pulse as you hold pressure. 2. Lift your fingers after 5 minutes to see if the bleeding has stopped.    3. Once the bleeding has stopped, gently wipe the wrist area clean and cover with a bandage. If the bleeding from your wrist does not stop after 15 minutes, or if there is a large amount of bleeding or spurting, call 911 immediately (do not drive yourself to the hospital). Other concerns: The site may be slightly bruised and sore following your procedure. Should any of the following occur, contact your physician immediately:   1. Any cool or coldness of the arm, discoloration over a large area, ongoing numbness or any abnormal sensations , moderate to severe pain or swelling in the arm. 2. Redness, soreness, swelling, chills or fever, or colored drainage at the procedure site within 3-7 days after your procedure. If you have any further questions or concerns regarding your procedure please call the Cardiac Cath Lab office at [phone]. During regular business hours ask to speak to Dr. Nola Aragon. During non-business hours the answering service will answer. Ask to speak to the physician on call for [facility].     Transfemoral Catheterization Discharge Instructions Norma Ram)     Do not drive, operate any machinery, or sign any legal documents for 24 hours after your procedure. You must have someone to drive you home.  You may take a shower 24 hours after your cardiac catheterization. Be sure to get the dressing wet and then remove it; gently wash the area with warm soapy water. Pat dry and leave open to air. To help prevent infections, be sure to keep the cath site clean and dry. No lotions, creams, powders, ointments, etc. in the cath site for approximately 1 week.  Do not take a tub bath, get in a hot tub or swimming pool for approximately 5 days or until the cath site is completely healed.  No strenuous activity or heavy lifting over 10 lbs. for 7 days.  Drink plenty of fluids for 24-48 hours after your cath to flush the contrast dye from your kidneys. No alcoholic beverages for 24 hours.   You may resume your previous diet (low fat, low cholesterol) after your cath.  After your cath, some bruising or discomfort is common during the healing process. Tylenol, 1-2 tablets every 6 hours as needed, is recommended if you experience any discomfort. If you experience any signs or symptoms of infection such as fever, chills, or poorly healing incision, persistent tenderness or swelling in the groin, redness and/or warmth to the touch, numbness, significant tingling or pain at the groin site or affected extremity, rash, drainage from the cath site, or if the leg feels tight or swollen, call your physician right away.  If bleeding at the cath site occurs, take a clean gauze pad and apply direct pressure to the groin just above the puncture site. Call 911 immediately, and continue to apply direct pressure until an ambulance gets to your location.  You may return to work  2  days after your cardiac cath if no groin bleeding. Information obtained by :  I understand that if any problems occur once I am at home I am to contact my physician. I understand and acknowledge receipt of the instructions indicated above. R.N.'s Signature                                                                  Date/Time                                                                                                                                              Patient or Representative Signature                                                          Date/Time      Irene Denny MD             [address] [facility], [address]    [phone]  [address]    [email or web address]

## 2021-05-17 NOTE — DISCHARGE SUMMARY
Cardiology Discharge Summary     Patient ID: Johnie Staples  [id number]  57 y.o.  1950    Admit Date: 5/16/2021  Discharge Date: 5/17/2021   Admitting Physician: Pablito Ro MD   Discharge Physician: Pablito Ro MD    Admission Diagnoses: CAD (coronary artery disease) [I25.10]    Discharge Diagnoses: Active Problems:    CAD (coronary artery disease) (5/16/2021)         Hospital Course:     Mr Johnie Staples presented with afib with RVR and chest pain in the setting of recent heavy alcohol use after death of his wife 2 months ago due to breast CA. He was hypotensive and had DCCV with conversion to NSR. He has diffuse ST depression with CP. Underwent LHC, showed non obstructive CAD. Remains stable. Cont on Xarelto for [facility]. He had already decided to stop alcohol. Today reports no CP, SOB, dizziness. Palpitations. Cath access site okay. He moved to [facility] and want to follow up as [facility] office. Visit Vitals  BP (!) 155/89   Pulse 71   Temp 98 °F (36.7 °C)   Resp 17   Ht 5' 7\" (1.702 m)   Wt 94.3 kg (207 lb 14.3 oz)   SpO2 100%   BMI 32.56 kg/m²       Examination :   General: Alert and oriented x3, no distress   HEENT: Neck supple, MMM, no JVP elevation   CV: S1S2, Regular rate and rhythm  Resp: Lungs clear, no distress   Abd: Soft, NT/ND   LE: No edema, warm   Neuro: Oriented x3, no focal deficit   Skin: Warm and dry       Recent Labs     05/17/21  0328 05/16/21 0152    133*   K 3.9 3.2*   BUN 25* 20   CREA 1.19 1.45*   WBC 7.8 10.7   HGB 12.2 12.9   HCT 37.4 38.4    231     Lab Results   Component Value Date/Time    Cholesterol, total 158 05/17/2021 03:28 AM    HDL Cholesterol 108 05/17/2021 03:28 AM    LDL, calculated 30.4 05/17/2021 03:28 AM    Triglyceride 98 05/17/2021 03:28 AM     Recent Labs     05/16/21  0152   ALT 33   AP 72     Recent Labs     05/16/21  0241   APTT 27.9        Patient was ambulating without symptoms prior to d/c.        Patient Instructions:   Current Discharge Medication List      CONTINUE these medications which have NOT CHANGED    Details   metoprolol succinate (TOPROL-XL) 100 mg tablet Take 100 mg by mouth nightly. rivaroxaban (Xarelto) 20 mg tab tablet Take 20 mg by mouth nightly. PT THINKS HE TAKES 20MG DOSE BUT CANNOT RECALL FOR SURE. HE DEFINITELY TAKES XARELTO NIGHTLY. potassium 99 mg tablet Take 99 mg by mouth nightly. atorvastatin (LIPITOR) 20 mg tablet Take 20 mg by mouth daily. Bedtime      lisinopril-hydrochlorothiazide (PRINZIDE, ZESTORETIC) 20-12.5 mg per tablet Take  by mouth nightly. Referenced discharge instructions provided by nursing for diet and activity.   Follow-up:   Follow-up Information     Follow up With Specialties Details Why Contact Info    Unknown, Provider    Patient not available to ask              > 30 minutes coordinating discharge  Yes     Signed:  Bhupendra Reeves MD  5/17/2021  12:19 PM

## 2021-05-17 NOTE — PROGRESS NOTES
In a CM perspective, pt is ready for discharge. RN made aware. Transition of Care Plan:    RUR: 9% - low risk  Disposition: home  Follow up appointments: PCP, Cardiology  DME needed: No needs identified  Transportation at Discharge: son -- in route. [place] or means to access home:    yes    IM Medicare letter: received 5/16/21  Caregiver Contact: alla Hernandez, [phone]  Discharge Caregiver contacted prior to discharge? Pt already contacted. Reason for Admission:  STEMI of anterior wall                   RUR Score:     9%                Plan for utilizing home health:      No needs identified    PCP: First and Last name:  Dr. Kofi Washington   Name of Practice:  [facility]   Are you a current patient: Yes/No: yes   Approximate date of last visit: 6 months ago   Can you participate in a virtual visit with your PCP:                     Current Advanced Directive/Advance Care Plan: Prior   Chris Gong (ACP) Conversation      Date of Conversation: 5/17/2021  Conducted with: Patient with Decision Making Capacity    Healthcare Decision Maker:   alla Hernandez, [phone]    Today we documented Decision Maker(s) consistent with Legal Next of Kin hierarchy. Pt reports he has a living will at home. States that his son is his primary decision maker. Content/Action Overview:   Has NO ACP documents/care preferences - information provided, considering goals and options  Reviewed DNR/DNI and patient elects Full Code (Attempt Resuscitation)    Length of Voluntary ACP Conversation in minutes:  <16 minutes (Non-Billable)                  Transition of Care Plan:    Home    CM met with patient at the bedside to complete initial assessment. CM introduced role, verified demographics, and discussed discharge planning. Mr. Florette Lombard is a 79year old male admitted for STEMI of anterior wall.  He is insured by [employer] and uses [facility] [employer] delivery or [facility] pharmacy at [facility] for prescriptions. He recently lost his wife and is now living with his son and daughter in law. The house is a split-level home that requires 6-7 steps to enter the home. His bedroom is on the second floor (6 steps). He is independent with ADLs and IADLs. Pt drives. Pt has a cane, shower chair, and rolling walker at home. He has use HH before but cannot remember the name. Denies hx of SNF or Inpt Rehab. At time of discharge, his son will transport him home. CM spoke to pt about discharge planning. He verbalized understanding and states that he has no questions or concerns. He states he already has a scheduled appt with his Dr. Pinky Walters (PCP) on 6/3. He has already called his son and he is in route. In a CM perspective, pt is ready for discharge. RN made aware. Care Management Interventions  PCP Verified by CM: Yes(Dr. Pinky Walters. Last appt 6 months ago.)  Mode of Transport at Discharge: Other (see comment)(son)  Transition of Care Consult (CM Consult): Discharge Planning  Discharge Durable Medical Equipment: No  Physical Therapy Consult: No  Occupational Therapy Consult: No  Speech Therapy Consult: No  Current Support Network:  Other(lives with son)  Confirm Follow Up Transport: Family  The Plan for Transition of Care is Related to the Following Treatment Goals : home  The Patient and/or Patient Representative was Provided with a Choice of Provider and Agrees with the Discharge Plan?: Yes  Name of the Patient Representative Who was Provided with a Choice of Provider and Agrees with the Discharge Plan: patient  Freedom of Choice List was Provided with Basic Dialogue that Supports the Patient's Individualized Plan of Care/Goals, Treatment Preferences and Shares the Quality Data Associated with the Providers?: Yes  Discharge Location  Discharge Placement: Home    Enrrique Baker RN, BSN, [address] [place] [address] Manager  [phone]

## 2021-05-17 NOTE — PROGRESS NOTES
RAPID RESPONSE TEAM- Follow Up    Rounded on patient due to recent transfer out of CCU. Discussed with primary RN, Suzanna Liu. No acute concerns, VSS, MEWS 1. Patient Vitals for the past 12 hrs:   Temp Pulse Resp BP SpO2   05/16/21 2240 98.1 °F (36.7 °C) 62 18 (!) 143/83 97 %   05/16/21 2000 98.5 °F (36.9 °C) 65 18 (!) 160/87 99 %   05/16/21 1548 98.6 °F (37 °C) 68 16 134/81 100 %   05/16/21 1255 98.3 °F (36.8 °C) 68 17 (!) 170/81 97 %   05/16/21 1245  78 13 (!) 149/64 100 %          No RRT interventions indicated at this time. Please call with any questions or concerns.      Antonioqueline Matters  Rapid Response [facility]OD[facility]

## 2021-05-17 NOTE — CARDIO/PULMONARY
Cardiac Rehab Note: chart review/referral  
 
CAD, cardiac cath 5/16/21 results: 
\"non obstructive CAD\" 
 
EF 55-60%  on 5/16/21 per echo Smoking history assessed. Patient is a current smoker. Patient d/c before seen.

## 2022-03-18 PROBLEM — I25.10 CAD (CORONARY ARTERY DISEASE): Status: ACTIVE | Noted: 2021-05-16

## 2023-05-11 RX ORDER — ATORVASTATIN CALCIUM 20 MG/1
TABLET, FILM COATED ORAL DAILY
COMMUNITY

## 2023-05-11 RX ORDER — METOPROLOL SUCCINATE 100 MG/1
100 TABLET, EXTENDED RELEASE ORAL NIGHTLY
COMMUNITY

## 2023-05-11 RX ORDER — LISINOPRIL AND HYDROCHLOROTHIAZIDE 20; 12.5 MG/1; MG/1
TABLET ORAL
COMMUNITY

## 2024-01-26 NOTE — Clinical Note
Pharmacist Review and Automatic Dose Adjustment of Prophylactic Enoxaparin         The reviewing pharmacist has made an adjustment to the ordered enoxaparin dose or converted to UFH per the approved [facility] protocol and table as identified below.        Abdirashid Holm Jr. is a 52 y.o. male.     Recent Labs     01/25/24  1718   CREATININE 1.2       Estimated Creatinine Clearance: 99 mL/min (based on SCr of 1.2 mg/dL).    Recent Labs     01/25/24  1718   HGB 13.7   HCT 41.2        No results for input(s): \"INR\" in the last 72 hours.    Height:   Ht Readings from Last 1 Encounters:   01/26/24 1.778 m (5' 10\")     Weight:  Wt Readings from Last 1 Encounters:   01/26/24 133.7 kg (294 lb 12.1 oz)               Plan: Based upon the patient's weight and renal function    Ordered: Enoxaparin 40mg SUBQ Daily    Changed/converted to    New Order: Enoxaparin 30mg SUBQ BID      Thank you,  Kody Chun [facility]  1/26/2024, 1:04 AM    Catheter removed.

## 2024-12-15 ENCOUNTER — APPOINTMENT (OUTPATIENT)
Facility: HOSPITAL | Age: 74
DRG: 310 | End: 2024-12-15
Payer: MEDICARE

## 2024-12-15 ENCOUNTER — HOSPITAL ENCOUNTER (OUTPATIENT)
Facility: HOSPITAL | Age: 74
Setting detail: OBSERVATION
LOS: 1 days | Discharge: HOME | DRG: 310 | End: 2024-12-16
Attending: STUDENT IN AN ORGANIZED HEALTH CARE EDUCATION/TRAINING PROGRAM | Admitting: INTERNAL MEDICINE
Payer: MEDICARE

## 2024-12-15 DIAGNOSIS — R00.1 BRADYCARDIA: ICD-10-CM

## 2024-12-15 DIAGNOSIS — Z86.79 HISTORY OF ATRIAL FIBRILLATION: ICD-10-CM

## 2024-12-15 DIAGNOSIS — R07.9 CHEST PAIN, UNSPECIFIED TYPE: Primary | ICD-10-CM

## 2024-12-15 LAB
ANION GAP SERPL CALC-SCNC: 5 MMOL/L (ref 2–12)
APTT PPP: 34.5 SEC (ref 22.1–31)
BASOPHILS # BLD: 0.1 K/UL (ref 0–0.1)
BASOPHILS NFR BLD: 1 % (ref 0–1)
BUN SERPL-MCNC: 26 MG/DL (ref 6–20)
BUN/CREAT SERPL: 16 (ref 12–20)
CALCIUM SERPL-MCNC: 9.9 MG/DL (ref 8.5–10.1)
CHLORIDE SERPL-SCNC: 103 MMOL/L (ref 97–108)
CO2 SERPL-SCNC: 25 MMOL/L (ref 21–32)
COMMENT:: NORMAL
CREAT SERPL-MCNC: 1.65 MG/DL (ref 0.7–1.3)
DIFFERENTIAL METHOD BLD: ABNORMAL
EKG ATRIAL RATE: 53 BPM
EKG DIAGNOSIS: NORMAL
EKG P-R INTERVAL: 112 MS
EKG Q-T INTERVAL: 476 MS
EKG QRS DURATION: 82 MS
EKG QTC CALCULATION (BAZETT): 446 MS
EKG R AXIS: 36 DEGREES
EKG T AXIS: 3 DEGREES
EKG VENTRICULAR RATE: 53 BPM
EOSINOPHIL # BLD: 0.1 K/UL (ref 0–0.4)
EOSINOPHIL NFR BLD: 2 % (ref 0–7)
ERYTHROCYTE [DISTWIDTH] IN BLOOD BY AUTOMATED COUNT: 13 % (ref 11.5–14.5)
GLUCOSE SERPL-MCNC: 113 MG/DL (ref 65–100)
HCT VFR BLD AUTO: 36.5 % (ref 36.6–50.3)
HGB BLD-MCNC: 12.4 G/DL (ref 12.1–17)
IMM GRANULOCYTES # BLD AUTO: 0 K/UL (ref 0–0.04)
IMM GRANULOCYTES NFR BLD AUTO: 1 % (ref 0–0.5)
INR PPP: 1.5 (ref 0.9–1.1)
LYMPHOCYTES # BLD: 1.7 K/UL (ref 0.8–3.5)
LYMPHOCYTES NFR BLD: 26 % (ref 12–49)
MAGNESIUM SERPL-MCNC: 2 MG/DL (ref 1.6–2.4)
MCH RBC QN AUTO: 36.5 PG (ref 26–34)
MCHC RBC AUTO-ENTMCNC: 34 G/DL (ref 30–36.5)
MCV RBC AUTO: 107.4 FL (ref 80–99)
MONOCYTES # BLD: 0.7 K/UL (ref 0–1)
MONOCYTES NFR BLD: 11 % (ref 5–13)
NEUTS SEG # BLD: 3.7 K/UL (ref 1.8–8)
NEUTS SEG NFR BLD: 59 % (ref 32–75)
NRBC # BLD: 0 K/UL (ref 0–0.01)
NRBC BLD-RTO: 0 PER 100 WBC
PLATELET # BLD AUTO: 280 K/UL (ref 150–400)
PMV BLD AUTO: 9.8 FL (ref 8.9–12.9)
POTASSIUM SERPL-SCNC: 4.5 MMOL/L (ref 3.5–5.1)
PROTHROMBIN TIME: 14.9 SEC (ref 9–11.1)
RBC # BLD AUTO: 3.4 M/UL (ref 4.1–5.7)
SODIUM SERPL-SCNC: 133 MMOL/L (ref 136–145)
SPECIMEN HOLD: NORMAL
THERAPEUTIC RANGE: ABNORMAL SECS (ref 58–77)
TROPONIN I SERPL HS-MCNC: 11 NG/L (ref 0–76)
TROPONIN I SERPL HS-MCNC: 8 NG/L (ref 0–76)
WBC # BLD AUTO: 6.3 K/UL (ref 4.1–11.1)

## 2024-12-15 PROCEDURE — 85610 PROTHROMBIN TIME: CPT

## 2024-12-15 PROCEDURE — G0378 HOSPITAL OBSERVATION PER HR: HCPCS

## 2024-12-15 PROCEDURE — 85730 THROMBOPLASTIN TIME PARTIAL: CPT

## 2024-12-15 PROCEDURE — 36415 COLL VENOUS BLD VENIPUNCTURE: CPT

## 2024-12-15 PROCEDURE — 6370000000 HC RX 637 (ALT 250 FOR IP): Performed by: INTERNAL MEDICINE

## 2024-12-15 PROCEDURE — 84484 ASSAY OF TROPONIN QUANT: CPT

## 2024-12-15 PROCEDURE — 93005 ELECTROCARDIOGRAM TRACING: CPT | Performed by: STUDENT IN AN ORGANIZED HEALTH CARE EDUCATION/TRAINING PROGRAM

## 2024-12-15 PROCEDURE — 80048 BASIC METABOLIC PNL TOTAL CA: CPT

## 2024-12-15 PROCEDURE — 1100000000 HC RM PRIVATE

## 2024-12-15 PROCEDURE — 85025 COMPLETE CBC W/AUTO DIFF WBC: CPT

## 2024-12-15 PROCEDURE — 71045 X-RAY EXAM CHEST 1 VIEW: CPT

## 2024-12-15 PROCEDURE — 2580000003 HC RX 258: Performed by: INTERNAL MEDICINE

## 2024-12-15 PROCEDURE — 83735 ASSAY OF MAGNESIUM: CPT

## 2024-12-15 PROCEDURE — 99285 EMERGENCY DEPT VISIT HI MDM: CPT

## 2024-12-15 PROCEDURE — 6370000000 HC RX 637 (ALT 250 FOR IP)

## 2024-12-15 RX ORDER — AMLODIPINE AND BENAZEPRIL HYDROCHLORIDE 5; 20 MG/1; MG/1
1 CAPSULE ORAL DAILY
COMMUNITY

## 2024-12-15 RX ORDER — ACETAMINOPHEN 325 MG/1
650 TABLET ORAL EVERY 4 HOURS PRN
Status: DISCONTINUED | OUTPATIENT
Start: 2024-12-15 | End: 2024-12-16

## 2024-12-15 RX ORDER — SPIRONOLACTONE 25 MG/1
25 TABLET ORAL DAILY
Status: DISCONTINUED | OUTPATIENT
Start: 2024-12-15 | End: 2024-12-16 | Stop reason: HOSPADM

## 2024-12-15 RX ORDER — AMLODIPINE AND BENAZEPRIL HYDROCHLORIDE 5; 20 MG/1; MG/1
1 CAPSULE ORAL DAILY
Status: DISCONTINUED | OUTPATIENT
Start: 2024-12-15 | End: 2024-12-15 | Stop reason: SDUPTHER

## 2024-12-15 RX ORDER — LISINOPRIL 20 MG/1
20 TABLET ORAL DAILY
Status: DISCONTINUED | OUTPATIENT
Start: 2024-12-15 | End: 2024-12-16 | Stop reason: HOSPADM

## 2024-12-15 RX ORDER — ACETAMINOPHEN 325 MG/1
650 TABLET ORAL EVERY 6 HOURS PRN
Status: DISCONTINUED | OUTPATIENT
Start: 2024-12-15 | End: 2024-12-16 | Stop reason: HOSPADM

## 2024-12-15 RX ORDER — AMIODARONE HYDROCHLORIDE 200 MG/1
200 TABLET ORAL DAILY
Status: DISCONTINUED | OUTPATIENT
Start: 2024-12-15 | End: 2024-12-16 | Stop reason: HOSPADM

## 2024-12-15 RX ORDER — POLYETHYLENE GLYCOL 3350 17 G/17G
17 POWDER, FOR SOLUTION ORAL DAILY PRN
Status: DISCONTINUED | OUTPATIENT
Start: 2024-12-15 | End: 2024-12-16 | Stop reason: HOSPADM

## 2024-12-15 RX ORDER — ASPIRIN 81 MG/1
324 TABLET, CHEWABLE ORAL ONCE
Status: COMPLETED | OUTPATIENT
Start: 2024-12-15 | End: 2024-12-15

## 2024-12-15 RX ORDER — ENOXAPARIN SODIUM 100 MG/ML
40 INJECTION SUBCUTANEOUS DAILY
Status: DISCONTINUED | OUTPATIENT
Start: 2024-12-15 | End: 2024-12-15

## 2024-12-15 RX ORDER — AMIODARONE HYDROCHLORIDE 200 MG/1
200 TABLET ORAL DAILY
COMMUNITY

## 2024-12-15 RX ORDER — ACETAMINOPHEN 650 MG/1
650 SUPPOSITORY RECTAL EVERY 6 HOURS PRN
Status: DISCONTINUED | OUTPATIENT
Start: 2024-12-15 | End: 2024-12-16 | Stop reason: HOSPADM

## 2024-12-15 RX ORDER — ONDANSETRON 2 MG/ML
4 INJECTION INTRAMUSCULAR; INTRAVENOUS EVERY 6 HOURS PRN
Status: DISCONTINUED | OUTPATIENT
Start: 2024-12-15 | End: 2024-12-16 | Stop reason: HOSPADM

## 2024-12-15 RX ORDER — SPIRONOLACTONE 25 MG/1
25 TABLET ORAL DAILY
COMMUNITY

## 2024-12-15 RX ORDER — SODIUM CHLORIDE 0.9 % (FLUSH) 0.9 %
5-40 SYRINGE (ML) INJECTION EVERY 12 HOURS SCHEDULED
Status: DISCONTINUED | OUTPATIENT
Start: 2024-12-15 | End: 2024-12-16 | Stop reason: HOSPADM

## 2024-12-15 RX ORDER — SODIUM CHLORIDE 9 MG/ML
INJECTION, SOLUTION INTRAVENOUS PRN
Status: DISCONTINUED | OUTPATIENT
Start: 2024-12-15 | End: 2024-12-16 | Stop reason: HOSPADM

## 2024-12-15 RX ORDER — ATORVASTATIN CALCIUM 20 MG/1
20 TABLET, FILM COATED ORAL NIGHTLY
Status: DISCONTINUED | OUTPATIENT
Start: 2024-12-15 | End: 2024-12-16 | Stop reason: HOSPADM

## 2024-12-15 RX ORDER — SODIUM CHLORIDE 0.9 % (FLUSH) 0.9 %
5-40 SYRINGE (ML) INJECTION PRN
Status: DISCONTINUED | OUTPATIENT
Start: 2024-12-15 | End: 2024-12-16 | Stop reason: HOSPADM

## 2024-12-15 RX ORDER — ONDANSETRON 2 MG/ML
4 INJECTION INTRAMUSCULAR; INTRAVENOUS EVERY 6 HOURS PRN
Status: DISCONTINUED | OUTPATIENT
Start: 2024-12-15 | End: 2024-12-16 | Stop reason: SDUPTHER

## 2024-12-15 RX ORDER — ONDANSETRON 4 MG/1
4 TABLET, ORALLY DISINTEGRATING ORAL EVERY 8 HOURS PRN
Status: DISCONTINUED | OUTPATIENT
Start: 2024-12-15 | End: 2024-12-16 | Stop reason: HOSPADM

## 2024-12-15 RX ORDER — AMLODIPINE BESYLATE 5 MG/1
5 TABLET ORAL DAILY
Status: DISCONTINUED | OUTPATIENT
Start: 2024-12-15 | End: 2024-12-16 | Stop reason: HOSPADM

## 2024-12-15 RX ADMIN — AMLODIPINE BESYLATE 5 MG: 5 TABLET ORAL at 12:25

## 2024-12-15 RX ADMIN — SODIUM CHLORIDE, PRESERVATIVE FREE 10 ML: 5 INJECTION INTRAVENOUS at 20:59

## 2024-12-15 RX ADMIN — ASPIRIN 324 MG: 81 TABLET, CHEWABLE ORAL at 01:24

## 2024-12-15 RX ADMIN — RIVAROXABAN 15 MG: 15 TABLET, FILM COATED ORAL at 17:40

## 2024-12-15 RX ADMIN — AMIODARONE HYDROCHLORIDE 200 MG: 200 TABLET ORAL at 12:25

## 2024-12-15 RX ADMIN — SPIRONOLACTONE 25 MG: 25 TABLET ORAL at 12:25

## 2024-12-15 RX ADMIN — SODIUM CHLORIDE, PRESERVATIVE FREE 10 ML: 5 INJECTION INTRAVENOUS at 10:51

## 2024-12-15 RX ADMIN — ATORVASTATIN CALCIUM 20 MG: 20 TABLET, FILM COATED ORAL at 20:59

## 2024-12-15 ASSESSMENT — HEART SCORE: ECG: NORMAL

## 2024-12-15 ASSESSMENT — LIFESTYLE VARIABLES
HOW OFTEN DO YOU HAVE A DRINK CONTAINING ALCOHOL: NEVER
HOW MANY STANDARD DRINKS CONTAINING ALCOHOL DO YOU HAVE ON A TYPICAL DAY: PATIENT DOES NOT DRINK

## 2024-12-15 NOTE — PROGRESS NOTES
Rivaroxaban 20 mg daily adjusted to 15 mg daily for CrCl 37 ml/min    Indication Afib    CrCl 15-50 ml/min    Will monitor and adjust if Scr improves

## 2024-12-15 NOTE — PROGRESS NOTES
0992. Report received on patient from Aleena BARRON (ED) for transfer to the floor. Patient to transfer with transport R/T telemetry status. Will arrive shortly    1900. End of Shift Note    Bedside shift change report given to Celia BARRON (oncoming nurse) by Herb Sykes RN (offgoing nurse).  Report included the following information SBAR, Kardex, Intake/Output, MAR, Recent Results, and Cardiac Rhythm Sinus Juan    Shift worked:  [id number]     Shift summary and any significant changes:     Patient cleared by cards medication change made     Concerns for physician to address:  D/C in AM     Zone phone for oncoming shift:          Activity:  Level of Assistance: Independent  Number times ambulated in hallways past shift: 0  Number of times OOB to chair past shift: 5    Cardiac:   Cardiac Monitoring: Yes      Cardiac Rhythm: Sinus juan    Access:  Current line(s): PIV     Genitourinary:        Respiratory:   O2 Device: None (Room air)  Chronic home O2 use?: NO  Incentive spirometer at bedside: N/A    GI:     Current diet:  ADULT DIET; Regular  Passing flatus: YES    Pain Management:   Patient states pain is manageable on current regimen: YES    Skin:  Wesley Scale Score: 23  Interventions: Wound Offloading (Prevention Methods): Bed, pressure reduction mattress    Patient Safety:  Fall Risk: Nursing Judgement-Fall Risk High(Add Comments): No  Fall Risk Interventions  Nursing Judgement-Fall Risk High(Add Comments): No    Active Consults:   IP CONSULT TO CARDIOLOGY    Length of Stay:  Expected LOS: 1  Actual LOS: 1    Herb Sykes RN

## 2024-12-15 NOTE — ED PROVIDER NOTES
[facility] EMERGENCY DEPT  EMERGENCY DEPARTMENT HISTORY AND PHYSICAL EXAM    Date: 12/15/2024  Patient Name: Felix Craven  MRN: [id number]  Birthdate 1950  Date of evaluation: 12/15/2024  Provider: Jessica Lyons MD   Note Started: 2:23 AM EST 12/15/24    HISTORY OF PRESENT ILLNESS     Chief Complaint   Patient presents with    Shortness of Breath     Pt  is feeling that he is feeling his chest heavy and his both arm weak        History Provided By: Patient    HPI: Felix Craven is a 74 y.o. male w/ h/o A-fib on Xarelto, HTN, and HLD presenting for evaluation of chest heaviness that began this evening. He notes waking from sleeping with heaviness and pressure throughout his entire chest and radiating down both arms with no modifying factors. He notes initial nausea and lightheadedness at time of onset, though these have resolved. He notes mild ongoing dyspnea, nonexertional and no orthopnea. He endorses sxs are similar to those previously when in a-fib with RVR and was recently admitted to [facility] with dc last week following cardioversion. He denies fever, cough, abdominal pain, vomiting, weakness, numbness, leg swelling, and all other sxs at this time. Per chart review, patient was seen in 2021 with concern for MI and was admitted for LHC and cardiac work-up. Patient was in A-fib with RVR at that time and was started on both rate and rhythm control. LHC show non-occlusive disease, with greatest area of stenosis 40-50% in D1 of LAD. Echo normal. Adherent to all medications including Xarelto without missed doses.     PAST MEDICAL HISTORY   Past Medical History:  Past Medical History:   Diagnosis Date    Arthritis     Chronic pain     chronic back pain    High cholesterol     Hypertension     Obesity        Past Surgical History:  Past Surgical History:   Procedure Laterality Date    GI  2007    colonscopy    NEUROLOGICAL SURGERY  2002    brain cyst-3rd ventricle    ORTHOPEDIC SURGERY  2008    lumbar boston/fusion

## 2024-12-15 NOTE — H&P
Hospitalist Admission Note    NAME:   Felix Craven   : 1950   MRN: [id number]     Date/Time: 12/15/2024 11:52 AM    Patient PCP: No primary care provider on file.    ______________________________________________________________________  Given the patient's current clinical presentation, I have a high level of concern for decompensation if discharged from the emergency department.  Complex decision making was performed, which includes reviewing the patient's available past medical records, laboratory results, and x-ray films.       My assessment of this patient's clinical condition and my plan of care is as follows.    Assessment / Plan:    Sinus bradycardia  Chest pain, atypical  PAF    -Recent admission [facility] , A-fib with RVR, with DCCV, and amiodarone load, s/p NEG lexiscan  -Admission for observation  -Troponin negative so far  -Holding metoprolol  -Continue amiodarone 200 mg daily  -Continue amlodipine/  -Continue Xarelto  -Cardiology on board      Mild elevated creatinine, creatinine baseline around 1.1-1.2  -Hold lisinopril  -Recheck BMP tomorrow    HLD, continue statin    Medical Decision Making:   I personally reviewed labs: CBC/BMP  I personally reviewed imaging: Chest x-ray  I personally reviewed EKG: Sinus bradycardia  Toxic drug monitoring:   Discussed case with: ED provider. After discussion I am in agreement that acuity of patient's medical condition necessitates hospital stay.      Code Status: Full code  DVT Prophylaxis: Xarelto  Baseline:     Subjective:   CHIEF COMPLAINT: Chest pain    HISTORY OF PRESENT ILLNESS:     Felix Craven is a 74 y.o.  male with PMHx significant for PAF, HTN, HLD who presented to ED with a chief complaint of chest heaviness.  He reports that he woke up day before yesterday from sleep with chest heaviness and pressure throughout his entire chest radiating down to his both arm, usually get this, symptoms with RVR, but when the EMS arrived he was not in  Reconciliation, Ar         Objective:   VITALS:    Patient Vitals for the past 24 hrs:   BP Temp Temp src Pulse Resp SpO2 Height Weight   12/15/24 1034 -- -- -- -- -- -- 1.702 m (5' 7\") 78.4 kg (172 lb 13.5 oz)   12/15/24 1030 129/67 97.7 °F (36.5 °C) Oral 51 16 95 % -- --   12/15/24 0920 -- 97.6 °F (36.4 °C) Oral (!) 47 16 -- -- --   12/15/24 0900 (!) 149/75 -- -- 50 13 98 % -- --   12/15/24 0459 117/64 -- -- (!) 47 14 98 % -- --   12/15/24 0359 (!) 128/103 -- -- (!) 48 11 98 % -- --   12/15/24 0340 -- -- -- (!) 47 13 99 % -- --   12/15/24 0317 -- -- -- 50 20 99 % -- --   12/15/24 0300 (!) 143/67 -- -- (!) 49 22 95 % -- --   12/15/24 0200 (!) 143/70 -- -- (!) 47 16 99 % -- --   12/15/24 0100 138/69 -- -- 50 13 99 % -- --   12/15/24 0043 (!) 161/79 97.5 °F (36.4 °C) Oral (!) 111 14 90 % -- --       Temp (24hrs), Av.6 °F (36.4 °C), Min:97.5 °F (36.4 °C), Max:97.7 °F (36.5 °C)        O2 Device: None (Room air)    Wt Readings from Last 12 Encounters:   12/15/24 78.4 kg (172 lb 13.5 oz)         PHYSICAL EXAM:  General:    Alert, cooperative, appears stated age.     Lungs:   CTA b/l.  No wheezing or Rhonchi. No rales.  Chest wall:  No tenderness.  No accessory muscle use.  Heart:   Regular  rhythm,  No  Murmur.   No edema  Abdomen:   Soft, NT. ND  BS+  Extremities: No cyanosis.  No clubbing,      Skin turgor normal, Radial dial pulse 2+. Capillary refill normal  Neurologic: No facial asymmetry. No aphasia or slurred speech. Symmetrical strength, Sensation grossly intact. AAOx4.         LAB DATA REVIEWED:    Recent Results (from the past 12 hour(s))   EKG 12 Lead    Collection Time: 12/15/24 12:36 AM   Result Value Ref Range    Ventricular Rate 53 BPM    Atrial Rate 53 BPM    P-R Interval 112 ms    QRS Duration 82 ms    Q-T Interval 476 ms    QTc Calculation (Bazett) 446 ms    R Axis 36 degrees    T Axis 3 degrees    Diagnosis       Sinus bradycardia  Otherwise normal ECG  When compared with ECG of 16-MAY-2021

## 2024-12-15 NOTE — ED NOTES
..ED TO INPATIENT SBAR HANDOFF    Patient Name: Felix Craven   :  1950  74 y.o.   MRN:  [id number]  ED Room #:  ER09/09     Situation  Code Status: Full Code   Allergies: Patient has no known allergies.  Weight: No data found.    Arrived from: home    Chief Complaint:   Chief Complaint   Patient presents with    Shortness of Breath     Pt  is feeling that he is feeling his chest heavy and his both arm weak        Hospital Problem/Diagnosis:  Principal Problem:    Chest pain  Resolved Problems:    * No resolved hospital problems. *      Mobility: no current mobility problem   ED Fall Risk: Presents to emergency department  because of falls (Syncope, seizure, or loss of consciousness): No, Age > 70: Yes, Altered Mental Status, Intoxication with alcohol or substance confusion (Disorientation, impaired judgment, poor safety awaremess, or inability to follow instructions): No, Impaired Mobility: Ambulates or transfers with assistive devices or assistance; Unable to ambulate or transer.: Yes, Nursing Judgement: Yes   Fell in ED or prior to admission: no   Restraints: no     Sitter: no   Family/Caregiver Present: yes    Neet to know social/safety information:     Background  History:   Past Medical History:   Diagnosis Date    Arthritis     Chronic pain     chronic back pain    High cholesterol     Hypertension     Obesity        Assessment    Abnormal Assessment Findings: chest heaviness  Imaging:   XR CHEST PORTABLE   Final Result   No acute process.      Electronically signed by Zen Díaz        Abnormal labs:   Abnormal Labs Reviewed   BASIC METABOLIC PANEL - Abnormal; Notable for the following components:       Result Value    Sodium 133 (*)     Glucose 113 (*)     BUN 26 (*)     Creatinine 1.65 (*)     Est, Glom Filt Rate 43 (*)     All other components within normal limits   CBC WITH AUTO DIFFERENTIAL - Abnormal; Notable for the following components:    RBC 3.40 (*)     Hematocrit 36.5 (*)     .4 (*)

## 2024-12-15 NOTE — CONSULTS
Automatic Reconciliation, Ar   lisinopril-hydroCHLOROthiazide (PRINZIDE;ZESTORETIC) 20-12.5 MG per tablet Take by mouth    Automatic Reconciliation, Ar   metoprolol succinate (TOPROL XL) 100 MG extended release tablet Take 1 tablet by mouth nightly    Automatic Reconciliation, Ar   potassium gluconate 550 mg tablet Take by mouth    Automatic Reconciliation, Ar   rivaroxaban (XARELTO) 20 MG TABS tablet Take by mouth    Automatic Reconciliation, Ar       Recent Results (from the past 24 hour(s))   EKG 12 Lead    Collection Time: 12/15/24 12:36 AM   Result Value Ref Range    Ventricular Rate 53 BPM    Atrial Rate 53 BPM    P-R Interval 112 ms    QRS Duration 82 ms    Q-T Interval 476 ms    QTc Calculation (Bazett) 446 ms    R Axis 36 degrees    T Axis 3 degrees    Diagnosis       Sinus bradycardia  Otherwise normal ECG  When compared with ECG of 16-MAY-2021 01:43,  Sinus rhythm has replaced Atrial fibrillation  Vent. rate has decreased  BPM  Questionable change in QRS duration     Basic Metabolic Panel    Collection Time: 12/15/24 12:46 AM   Result Value Ref Range    Sodium 133 (L) 136 - 145 mmol/L    Potassium 4.5 3.5 - 5.1 mmol/L    Chloride 103 97 - 108 mmol/L    CO2 25 21 - 32 mmol/L    Anion Gap 5 2 - 12 mmol/L    Glucose 113 (H) 65 - 100 mg/dL    BUN 26 (H) 6 - 20 MG/DL    Creatinine 1.65 (H) 0.70 - 1.30 MG/DL    BUN/Creatinine Ratio 16 12 - 20      Est, Glom Filt Rate 43 (L) >60 ml/min/1.73m2    Calcium 9.9 8.5 - 10.1 MG/DL   CBC with Auto Differential    Collection Time: 12/15/24 12:46 AM   Result Value Ref Range    WBC 6.3 4.1 - 11.1 K/uL    RBC 3.40 (L) 4.10 - 5.70 M/uL    Hemoglobin 12.4 12.1 - 17.0 g/dL    Hematocrit 36.5 (L) 36.6 - 50.3 %    .4 (H) 80.0 - 99.0 FL    MCH 36.5 (H) 26.0 - 34.0 PG    MCHC 34.0 30.0 - 36.5 g/dL    RDW 13.0 11.5 - 14.5 %    Platelets 280 150 - 400 K/uL    MPV 9.8 8.9 - 12.9 FL    Nucleated RBCs 0.0 0  WBC    nRBC 0.00 0.00 - 0.01 K/uL    Neutrophils % 59 32 -  75 %    Lymphocytes % 26 12 - 49 %    Monocytes % 11 5 - 13 %    Eosinophils % 2 0 - 7 %    Basophils % 1 0 - 1 %    Immature Granulocytes % 1 (H) 0.0 - 0.5 %    Neutrophils Absolute 3.7 1.8 - 8.0 K/UL    Lymphocytes Absolute 1.7 0.8 - 3.5 K/UL    Monocytes Absolute 0.7 0.0 - 1.0 K/UL    Eosinophils Absolute 0.1 0.0 - 0.4 K/UL    Basophils Absolute 0.1 0.0 - 0.1 K/UL    Immature Granulocytes Absolute 0.0 0.00 - 0.04 K/UL    Differential Type AUTOMATED     Troponin    Collection Time: 12/15/24 12:46 AM   Result Value Ref Range    Troponin, High Sensitivity 8 0 - 76 ng/L   Magnesium    Collection Time: 12/15/24 12:46 AM   Result Value Ref Range    Magnesium 2.0 1.6 - 2.4 mg/dL   Extra Tubes Hold    Collection Time: 12/15/24 12:46 AM   Result Value Ref Range    Specimen HOld 2PST [facility]     Comment:        Add-on orders for these samples will be processed based on acceptable specimen integrity and analyte stability, which may vary by analyte.   Protime-INR    Collection Time: 12/15/24 12:46 AM   Result Value Ref Range    INR 1.5 (H) 0.9 - 1.1      Protime 14.9 (H) 9.0 - 11.1 sec   APTT    Collection Time: 12/15/24 12:46 AM   Result Value Ref Range    APTT 34.5 (H) 22.1 - 31.0 sec    Therapeutic Range   58.0 - 77.0 SECS   Troponin    Collection Time: 12/15/24  3:45 AM   Result Value Ref Range    Troponin, High Sensitivity 11 0 - 76 ng/L        Gonzalo Francis DO

## 2024-12-16 VITALS
DIASTOLIC BLOOD PRESSURE: 62 MMHG | HEIGHT: 67 IN | SYSTOLIC BLOOD PRESSURE: 130 MMHG | OXYGEN SATURATION: 100 % | RESPIRATION RATE: 20 BRPM | HEART RATE: 63 BPM | WEIGHT: 172.84 LBS | BODY MASS INDEX: 27.13 KG/M2 | TEMPERATURE: 98 F

## 2024-12-16 LAB
ANION GAP SERPL CALC-SCNC: 5 MMOL/L (ref 2–12)
BUN SERPL-MCNC: 24 MG/DL (ref 6–20)
BUN/CREAT SERPL: 17 (ref 12–20)
CALCIUM SERPL-MCNC: 10 MG/DL (ref 8.5–10.1)
CHLORIDE SERPL-SCNC: 103 MMOL/L (ref 97–108)
CO2 SERPL-SCNC: 25 MMOL/L (ref 21–32)
CREAT SERPL-MCNC: 1.44 MG/DL (ref 0.7–1.3)
ERYTHROCYTE [DISTWIDTH] IN BLOOD BY AUTOMATED COUNT: 12.9 % (ref 11.5–14.5)
GLUCOSE SERPL-MCNC: 103 MG/DL (ref 65–100)
HCT VFR BLD AUTO: 36 % (ref 36.6–50.3)
HGB BLD-MCNC: 11.9 G/DL (ref 12.1–17)
MAGNESIUM SERPL-MCNC: 2.1 MG/DL (ref 1.6–2.4)
MCH RBC QN AUTO: 36 PG (ref 26–34)
MCHC RBC AUTO-ENTMCNC: 33.1 G/DL (ref 30–36.5)
MCV RBC AUTO: 108.8 FL (ref 80–99)
NRBC # BLD: 0 K/UL (ref 0–0.01)
NRBC BLD-RTO: 0 PER 100 WBC
PLATELET # BLD AUTO: 245 K/UL (ref 150–400)
PMV BLD AUTO: 9.2 FL (ref 8.9–12.9)
POTASSIUM SERPL-SCNC: 4.3 MMOL/L (ref 3.5–5.1)
RBC # BLD AUTO: 3.31 M/UL (ref 4.1–5.7)
SODIUM SERPL-SCNC: 133 MMOL/L (ref 136–145)
TROPONIN I SERPL HS-MCNC: 8 NG/L (ref 0–76)
WBC # BLD AUTO: 7.2 K/UL (ref 4.1–11.1)

## 2024-12-16 PROCEDURE — 36415 COLL VENOUS BLD VENIPUNCTURE: CPT

## 2024-12-16 PROCEDURE — 83735 ASSAY OF MAGNESIUM: CPT

## 2024-12-16 PROCEDURE — 80048 BASIC METABOLIC PNL TOTAL CA: CPT

## 2024-12-16 PROCEDURE — 84484 ASSAY OF TROPONIN QUANT: CPT

## 2024-12-16 PROCEDURE — 6370000000 HC RX 637 (ALT 250 FOR IP): Performed by: STUDENT IN AN ORGANIZED HEALTH CARE EDUCATION/TRAINING PROGRAM

## 2024-12-16 PROCEDURE — 85027 COMPLETE CBC AUTOMATED: CPT

## 2024-12-16 PROCEDURE — 2580000003 HC RX 258: Performed by: INTERNAL MEDICINE

## 2024-12-16 PROCEDURE — G0378 HOSPITAL OBSERVATION PER HR: HCPCS

## 2024-12-16 PROCEDURE — 6370000000 HC RX 637 (ALT 250 FOR IP): Performed by: INTERNAL MEDICINE

## 2024-12-16 PROCEDURE — 93005 ELECTROCARDIOGRAM TRACING: CPT | Performed by: INTERNAL MEDICINE

## 2024-12-16 RX ORDER — ACETAMINOPHEN 325 MG/1
975 TABLET ORAL ONCE
Status: COMPLETED | OUTPATIENT
Start: 2024-12-16 | End: 2024-12-16

## 2024-12-16 RX ADMIN — SODIUM CHLORIDE, PRESERVATIVE FREE 10 ML: 5 INJECTION INTRAVENOUS at 08:47

## 2024-12-16 RX ADMIN — SPIRONOLACTONE 25 MG: 25 TABLET ORAL at 08:47

## 2024-12-16 RX ADMIN — AMIODARONE HYDROCHLORIDE 200 MG: 200 TABLET ORAL at 08:47

## 2024-12-16 RX ADMIN — AMLODIPINE BESYLATE 5 MG: 5 TABLET ORAL at 08:47

## 2024-12-16 RX ADMIN — ACETAMINOPHEN 975 MG: 325 TABLET ORAL at 05:37

## 2024-12-16 ASSESSMENT — PAIN DESCRIPTION - DESCRIPTORS: DESCRIPTORS: SPASM

## 2024-12-16 ASSESSMENT — PAIN DESCRIPTION - ORIENTATION: ORIENTATION: ANTERIOR

## 2024-12-16 ASSESSMENT — PAIN - FUNCTIONAL ASSESSMENT: PAIN_FUNCTIONAL_ASSESSMENT: ACTIVITIES ARE NOT PREVENTED

## 2024-12-16 ASSESSMENT — PAIN SCALES - GENERAL: PAINLEVEL_OUTOF10: 5

## 2024-12-16 ASSESSMENT — PAIN DESCRIPTION - LOCATION: LOCATION: CHEST

## 2024-12-16 NOTE — DISCHARGE INSTRUCTIONS
HOSPITALIST DISCHARGE INSTRUCTIONS    NAME: Felix Craven   :  1950   MRN:  [id number]     Date/Time:  2024 11:05 AM    ADMIT DATE: 12/15/2024   DISCHARGE DATE: 2024     Sinus bradycardia  Chest pain, atypical  HLD    It is important that you take the medication exactly as they are prescribed.   Keep your medication in the bottles provided by the pharmacist and keep a list of the medication names, dosages, and times to be taken in your wallet.   Do not take other medications without consulting your doctor.       What to do at Home    Recommended diet:  cardiac diet    Recommended activity: activity as tolerated      If you have questions regarding the hospital related prescriptions or hospital related issues please call [facility]' office at . You can always direct your questions to your primary care doctor if you are unable to reach your hospital physician; your PCP works as an extension of your hospital doctor just like your hospital doctor is an extension of your PCP for your time at the hospital ([facility])    If you experience any of the following symptoms then please call your primary care physician or return to the emergency room if you cannot get hold of your doctor:    Fever, chills, nausea, vomiting, or persistent diarrhea  Worsening weakness or new problems with your speech or balance  Dark stools or visible blood in your stools  New Leg swelling or shortness of breath as these could be signs of a clot    Additional Instructions:      Bring these papers with you to your follow up appointments. The papers will help your doctors be sure to continue the care plan from the hospital.              Information obtained by :  I understand that if any problems occur once I am at home I am to contact my physician.    I understand and acknowledge receipt of the instructions indicated above.                                                                                                                                            Physician's or R.N.'s Signature                                                                  Date/Time                                                                                                                                              Patient or Representative Signature

## 2024-12-16 NOTE — PLAN OF CARE
Problem: Discharge Planning  Goal: Discharge to home or other facility with appropriate resources  Recent Flowsheet Documentation  Taken 12/16/2024 0800 by Yadira Ba RN  Discharge to home or other facility with appropriate resources: Identify barriers to discharge with patient and caregiver  12/16/2024 0302 by Celia Bowers RN  Outcome: Progressing     Problem: ABCDS Injury Assessment  Goal: Absence of physical injury  12/16/2024 0952 by Yadira Ba RN  Outcome: Progressing  12/16/2024 0302 by Celia Bowers RN  Outcome: Progressing     Problem: Safety - Adult  Goal: Free from fall injury  12/16/2024 0952 by Yadira Ba RN  Outcome: Progressing  12/16/2024 0302 by Celia Bowers RN  Outcome: Progressing

## 2024-12-16 NOTE — PLAN OF CARE
Problem: Discharge Planning  Goal: Discharge to home or other facility with appropriate resources  12/16/2024 1121 by Yadira Ba RN  Outcome: Adequate for Discharge  Flowsheets (Taken 12/16/2024 0800)  Discharge to home or other facility with appropriate resources: Identify barriers to discharge with patient and caregiver  12/16/2024 0302 by Celia Bowers RN  Outcome: Progressing     Problem: ABCDS Injury Assessment  Goal: Absence of physical injury  12/16/2024 1121 by Yadira Ba RN  Outcome: Adequate for Discharge  12/16/2024 0952 by Yadira Ba RN  Outcome: Progressing  12/16/2024 0302 by Celia Bowers RN  Outcome: Progressing     Problem: Safety - Adult  Goal: Free from fall injury  12/16/2024 1121 by Yadira Ba RN  Outcome: Adequate for Discharge  12/16/2024 0952 by Yadira Ba RN  Outcome: Progressing  12/16/2024 0302 by Celia Bowers RN  Outcome: Progressing

## 2024-12-16 NOTE — PROGRESS NOTES
Patient received discharge orders to go home.  Discussed his discharge instruction in detail with the patient and his son who is going to drive him home.  They both had the opportunity to have their questions answered, are aware of medications and follow-ups.  IV was removed and all belongings were returned.  CM aware of discharge and has seen the patient.  Patient discharged home with son.

## 2024-12-16 NOTE — PROGRESS NOTES
Bedside and Verbal shift change report given to Angely (oncoming nurse) by Billy (offgoing nurse). Report included the following information Nurse Handoff Report, Index, Intake/Output, MAR, Recent Results, and Cardiac Rhythm Sinus reza .         5099-2291: Pt c/o chest tightness and pressure (5/10 pain) radiating to B/L arms and slight SOB. States it is the same feeling of how pt presented on initial admission to ED.     Pt is appearing to be having spasms. States it happens when his chest tightens. VSS, o2 @ 99% RA. No longer SOB     0515: RRT called overhead. EKG and AM labs + troponin completed. EKG: sinus bradycardia. Labs pending. Orders placed by MD Wolfgang at bedside. Per assessment, also noted abd hernia present when spasm/ bearing down.     0537: 975 mg tylenol given. Pt still having occasional spasms.    End of Shift Note    Bedside shift change report given to  (oncoming nurse) by ANGELY BOGGS RN (offgoing nurse).  Report included the following information SBAR, Kardex, MAR, Recent Results, and Cardiac Rhythm Sinus reza    Shift worked:  7p-7a     Shift summary and any significant changes:     See above     Concerns for physician to address:       Zone phone for oncoming shift:          Activity:  Level of Assistance: Independent  Number times ambulated in hallways past shift: 1  Number of times OOB to chair past shift: 1    Cardiac:   Cardiac Monitoring: Yes      Cardiac Rhythm: Sinus reza    Access:  Current line(s): PIV     Genitourinary:        Respiratory:   O2 Device: None (Room air)  Chronic home O2 use?: NO  Incentive spirometer at bedside: NO    GI:     Current diet:  ADULT DIET; Regular  Passing flatus: YES    Pain Management:   Patient states pain is manageable on current regimen: YES    Skin:  Wesley Scale Score: 23  Interventions: Wound Offloading (Prevention Methods): Bed, pressure reduction mattress    Patient Safety:  Fall Risk: Nursing Judgement-Fall Risk High(Add Comments):

## 2024-12-16 NOTE — DISCHARGE SUMMARY
Discharge Summary    Name: Felix Craven  [id number]  YOB: 1950 (Age: 74 y.o.)   Date of Admission: 12/15/2024  Date of Discharge: 12/16/2024  Attending Physician: Isidoro Lee MD    Discharge Diagnosis:   Sinus bradycardia  Chest pain, atypical  HLD       Consultations:  IP CONSULT TO CARDIOLOGY      Brief Admission History/Reason for Admission Per Isidoro Lee MD:   Felix Craven is a 74 y.o.  male with PMHx significant for PAF, HTN, HLD who presented to ED with a chief complaint of chest heaviness.  He reports that he woke up day before yesterday from sleep with chest heaviness and pressure throughout his entire chest radiating down to his both arm, usually get this, symptoms with RVR, but when the EMS arrived he was not in RVR, his heart rate were controlled.  He was recently in [facility]  We were asked to admit for work up and evaluation of the above problems.        Brief Hospital Course by Main Problems:   Sinus bradycardia  Chest pain, atypical  PAF     -Recent admission [facility] 11/28/24-12/2/2024, A-fib with RVR, with DCCV, and amiodarone load, s/p NEG lexiscan  -Troponin negative so far  -Holding metoprolol  -Continue amiodarone 200 mg daily  -Continue amlodipine  -Continue Xarelto  -Cardiology on board  -Overnight episode of chest discomfort, with negative enzymes and no EKG change.  Seen by cardiology today, okay to discharge home.  Continue to hold metoprolol.  Continue amiodarone        Mild elevated creatinine, creatinine baseline around 1.1-1.2  -Creatinine improved, resume ACE inhibitor on discharge  HLD, continue statin         Discharge Exam:  Patient seen and examined by me on discharge day.  Pertinent Findings:  Patient Vitals for the past 24 hrs:   BP Temp Temp src Pulse Resp SpO2   12/16/24 0847 130/62 98 °F (36.7 °C) Oral 63 -- --   12/16/24 0525 (!) 148/70 97.4 °F (36.3 °C) Oral 54 20 --   12/16/24 0515 (!) 145/107 -- -- 52 -- 100 %

## 2024-12-16 NOTE — PROGRESS NOTES
Attempted to deliver and verbally explain the VOON/MOON with patient. Patient was JASON. Marely Taylor, Care Management Assistant

## 2024-12-16 NOTE — CARE COORDINATION
Homemaking Independent   Ambulation Assistance Independent   Prior Level of Assist for Transfers Independent   Active  Yes   Mode of Transportation Car   Discharge Planning   Type of Residence House   Living Arrangements Children;Family Members   Current Services Prior To Admission None   Potential Assistance Needed N/A   DME Ordered? No   Potential Assistance Purchasing Medications No   Type of Home Care Services None   Patient expects to be discharged to: House   Services At/After Discharge   Transition of Care Consult (CM Consult) N/A   Services At/After Discharge None   Mode of Transport at Discharge Other (see comment)  (Son at bedside)   Confirm Follow Up Transport Family   Condition of Participation: Discharge Planning   The Plan for Transition of Care is related to the following treatment goals: Return home with follow ups; [facility] contact information listed on AVS   The Patient and/or Patient Representative was provided with a Choice of Provider? Patient   The Patient and/Or Patient Representative agree with the Discharge Plan? Yes     Advance Care Planning     General Advance Care Planning (ACP) Conversation    Date of Conversation: 12/16/2024  Conducted with: Patient with Decision Making Capacity  Other persons present: Son      Healthcare Decision Maker: No healthcare decision makers have been documented.     Today we documented Decision Maker(s) consistent with Legal Next of Kin hierarchy.  Content/Action Overview:  Has NO ACP documents-Information provided  Reviewed DNR/DNI and patient elects Full Code (Attempt Resuscitation)  treatment goals    Length of Voluntary ACP Conversation in minutes:  <16 minutes (Non-Billable)    Trixie Suresh, RN            Trixie Suresh BSN, RN    Care Management  [phone]

## 2024-12-16 NOTE — PROGRESS NOTES
Progress Note      2024 8:32 AM  NAME: Felix Craven   MRN:  [id number]   Admit Diagnosis: Bradycardia [R00.1]  Chest pain [R07.9]  History of atrial fibrillation [Z86.79]  Chest pain, unspecified type [R07.9]      Problem List:     CP syndrome  Bradycardia  Possible JERI; resolving    Problem List:  1. Paroxysmal atrial fibrillation 2018 s/p DC Cardioversion. Most recently recurrent atrial fibrillation  in the setting of excessive alcohol use. DCCV 21.  2. Cardiac cath 2018 showed mild coronary irregularities, but no significant CAD. LHC 21, no obstructive CAD, high left sided filling pressures.  3. Echo 2018 showed EF 70-75%, mild LVH, no significant valvular abnormality  4. Hypertension  5. Hyperlipidemia  6. Hx tobacco use.  7. Hypokalemia  8. Multiple orthopedic problems  9.  s/p craniotomy for colloid cyst      His wife  last April from metastatic cancer      Soma/Rehorn     Assessment/Plan:   HRs in 50s    RRT for CP last night.  EKG nonischemic.  Trops still normal.  Recent negative MPI.  Pt says it was bilat arm tingling and not chest pain.    CV @ [facility]  w/ normal stress test  Appt [facility] [address] Dawn [address]     TnI neg x 2     Continue amio  Hold metoprolol  Continue xarelto  Continue norvasc  Continue ACEi  Continue aldactone  Home today         [x]       High complexity decision making was performed in this patient at high risk for decompensation with multiple organ involvement.    Subjective:     Felix Craven denies chest pain, dyspnea.  Discussed with RN events overnight.     Review of Systems:    Symptom Y/N Comments  Symptom Y/N Comments   Fever/Chills N   Chest Pain N    Poor Appetite N   Edema N    Cough N   Abdominal Pain N    Sputum N   Joint Pain N    SOB/LAMAS N   Pruritis/Rash N    Nausea/vomit N   Tolerating PT/OT Y    Diarrhea N   Tolerating Diet Y    Constipation N   Other       Could NOT obtain due to:      Objective:      Physical Exam:    Last 24hrs VS

## 2024-12-16 NOTE — SIGNIFICANT EVENT
RAPID RESPONSE TEAM    Overhead rapid response paged to room # 2103/01  at 0516    Reason for rapid response: Chest Pain     Initial assessment:   Patient is alert with complaints of ongoing mid-sternal chest pain that radiates to neck and both arms. Respirations even and unlabored. Denies SOB, N/V. VSS at this time.     Dr. Goss at bedside, orders received for the following Interventions:     - EKG- SB, no ischemic changes per MD  - Troponin  - Tylenol 975 mg po x1    Outcome:   pt to remain in room # 2103/01         Please call with any questions or concerns    Rain Upton RN  Rapid Response Team  Ext [phone]     Recent Results (from the past 8 hour(s))   Magnesium    Collection Time: 12/16/24  5:24 AM   Result Value Ref Range    Magnesium 2.1 1.6 - 2.4 mg/dL   Basic Metabolic Panel    Collection Time: 12/16/24  5:24 AM   Result Value Ref Range    Sodium 133 (L) 136 - 145 mmol/L    Potassium 4.3 3.5 - 5.1 mmol/L    Chloride 103 97 - 108 mmol/L    CO2 25 21 - 32 mmol/L    Anion Gap 5 2 - 12 mmol/L    Glucose 103 (H) 65 - 100 mg/dL    BUN 24 (H) 6 - 20 MG/DL    Creatinine 1.44 (H) 0.70 - 1.30 MG/DL    BUN/Creatinine Ratio 17 12 - 20      Est, Glom Filt Rate 51 (L) >60 ml/min/1.73m2    Calcium 10.0 8.5 - 10.1 MG/DL   CBC    Collection Time: 12/16/24  5:24 AM   Result Value Ref Range    WBC 7.2 4.1 - 11.1 K/uL    RBC 3.31 (L) 4.10 - 5.70 M/uL    Hemoglobin 11.9 (L) 12.1 - 17.0 g/dL    Hematocrit 36.0 (L) 36.6 - 50.3 %    .8 (H) 80.0 - 99.0 FL    MCH 36.0 (H) 26.0 - 34.0 PG    MCHC 33.1 30.0 - 36.5 g/dL    RDW 12.9 11.5 - 14.5 %    Platelets 245 150 - 400 K/uL    MPV 9.2 8.9 - 12.9 FL    Nucleated RBCs 0.0 0  WBC    nRBC 0.00 0.00 - 0.01 K/uL   Troponin    Collection Time: 12/16/24  5:28 AM   Result Value Ref Range    Troponin, High Sensitivity 8 0 - 76 ng/L

## 2024-12-22 LAB
EKG ATRIAL RATE: 52 BPM
EKG DIAGNOSIS: NORMAL
EKG P-R INTERVAL: 120 MS
EKG Q-T INTERVAL: 480 MS
EKG QRS DURATION: 78 MS
EKG QTC CALCULATION (BAZETT): 446 MS
EKG R AXIS: 42 DEGREES
EKG T AXIS: 27 DEGREES
EKG VENTRICULAR RATE: 52 BPM

## 2025-08-18 ENCOUNTER — APPOINTMENT (OUTPATIENT)
Facility: HOSPITAL | Age: 75
End: 2025-08-18
Payer: MEDICARE

## 2025-08-18 ENCOUNTER — HOSPITAL ENCOUNTER (INPATIENT)
Facility: HOSPITAL | Age: 75
LOS: 3 days | Discharge: HOME | End: 2025-08-21
Attending: STUDENT IN AN ORGANIZED HEALTH CARE EDUCATION/TRAINING PROGRAM | Admitting: FAMILY MEDICINE
Payer: MEDICARE

## 2025-08-18 DIAGNOSIS — I48.91 ATRIAL FIBRILLATION, UNSPECIFIED TYPE (HCC): ICD-10-CM

## 2025-08-18 DIAGNOSIS — I83.891 VARICOSE VEINS OF LEG WITH SWELLING, RIGHT: ICD-10-CM

## 2025-08-18 DIAGNOSIS — R50.81 FEVER IN OTHER DISEASES: Primary | ICD-10-CM

## 2025-08-18 DIAGNOSIS — A41.9 SEPSIS WITH ACUTE ORGAN DYSFUNCTION AND SEPTIC SHOCK, DUE TO UNSPECIFIED ORGANISM, UNSPECIFIED ORGAN DYSFUNCTION TYPE (HCC): ICD-10-CM

## 2025-08-18 DIAGNOSIS — R65.21 SEPSIS WITH ACUTE ORGAN DYSFUNCTION AND SEPTIC SHOCK, DUE TO UNSPECIFIED ORGANISM, UNSPECIFIED ORGAN DYSFUNCTION TYPE (HCC): ICD-10-CM

## 2025-08-18 DIAGNOSIS — I48.0 PAROXYSMAL ATRIAL FIBRILLATION (HCC): ICD-10-CM

## 2025-08-18 LAB
ALBUMIN SERPL-MCNC: 4 G/DL (ref 3.5–5.2)
ALBUMIN/GLOB SERPL: 1.4 (ref 1.1–2.2)
ALP SERPL-CCNC: 54 U/L (ref 40–129)
ALT SERPL-CCNC: 22 U/L (ref 10–50)
ANION GAP SERPL CALC-SCNC: 12 MMOL/L (ref 2–14)
APPEARANCE UR: CLEAR
AST SERPL-CCNC: 22 U/L (ref 10–50)
BACTERIA URNS QL MICRO: NEGATIVE /HPF
BASOPHILS # BLD: 0.04 K/UL (ref 0–0.1)
BASOPHILS NFR BLD: 0.3 % (ref 0–1)
BILIRUB SERPL-MCNC: 0.6 MG/DL (ref 0–1.2)
BILIRUB UR QL: NEGATIVE
BUN SERPL-MCNC: 21 MG/DL (ref 8–23)
BUN/CREAT SERPL: 17 (ref 12–20)
CALCIUM SERPL-MCNC: 11 MG/DL (ref 8.8–10.2)
CHLORIDE SERPL-SCNC: 96 MMOL/L (ref 98–107)
CO2 SERPL-SCNC: 24 MMOL/L (ref 20–29)
COLOR UR: ABNORMAL
COMMENT:: NORMAL
CREAT SERPL-MCNC: 1.21 MG/DL (ref 0.7–1.2)
DIFFERENTIAL METHOD BLD: ABNORMAL
EKG DIAGNOSIS: NORMAL
EKG Q-T INTERVAL: 316 MS
EKG QRS DURATION: 72 MS
EKG QTC CALCULATION (BAZETT): 444 MS
EKG R AXIS: 61 DEGREES
EKG T AXIS: 7 DEGREES
EKG VENTRICULAR RATE: 119 BPM
EOSINOPHIL # BLD: 0 K/UL (ref 0–0.4)
EOSINOPHIL NFR BLD: 0 % (ref 0–7)
EPITH CASTS URNS QL MICRO: ABNORMAL /LPF
ERYTHROCYTE [DISTWIDTH] IN BLOOD BY AUTOMATED COUNT: 12.2 % (ref 11.5–14.5)
FLUAV RNA SPEC QL NAA+PROBE: NOT DETECTED
FLUBV RNA SPEC QL NAA+PROBE: NOT DETECTED
GLOBULIN SER CALC-MCNC: 2.8 G/DL (ref 2–4)
GLUCOSE SERPL-MCNC: 91 MG/DL (ref 65–100)
GLUCOSE UR STRIP.AUTO-MCNC: NEGATIVE MG/DL
HCT VFR BLD AUTO: 35 % (ref 36.6–50.3)
HGB BLD-MCNC: 11.7 G/DL (ref 12.1–17)
HGB UR QL STRIP: NEGATIVE
HYALINE CASTS URNS QL MICRO: ABNORMAL /LPF (ref 0–2)
IMM GRANULOCYTES # BLD AUTO: 0.11 K/UL (ref 0–0.04)
IMM GRANULOCYTES NFR BLD AUTO: 0.8 % (ref 0–0.5)
KETONES UR QL STRIP.AUTO: ABNORMAL MG/DL
LACTATE BLD-SCNC: 1.42 MMOL/L (ref 0.4–2)
LEUKOCYTE ESTERASE UR QL STRIP.AUTO: NEGATIVE
LYMPHOCYTES # BLD: 0.23 K/UL (ref 0.8–3.5)
LYMPHOCYTES NFR BLD: 1.7 % (ref 12–49)
MCH RBC QN AUTO: 35.1 PG (ref 26–34)
MCHC RBC AUTO-ENTMCNC: 33.4 G/DL (ref 30–36.5)
MCV RBC AUTO: 105.1 FL (ref 80–99)
MONOCYTES # BLD: 0.61 K/UL (ref 0–1)
MONOCYTES NFR BLD: 4.5 % (ref 5–13)
NEUTS SEG # BLD: 12.51 K/UL (ref 1.8–8)
NEUTS SEG NFR BLD: 92.7 % (ref 32–75)
NITRITE UR QL STRIP.AUTO: NEGATIVE
NRBC # BLD: 0 K/UL (ref 0–0.01)
NRBC BLD-RTO: 0 PER 100 WBC
NT PRO BNP: 761 PG/ML (ref 0–125)
PH UR STRIP: 6 (ref 5–8)
PLATELET # BLD AUTO: 194 K/UL (ref 150–400)
PMV BLD AUTO: 8.9 FL (ref 8.9–12.9)
POTASSIUM SERPL-SCNC: 4.4 MMOL/L (ref 3.5–5.1)
PROCALCITONIN SERPL-MCNC: 0.4 NG/ML
PROT SERPL-MCNC: 6.9 G/DL (ref 6.4–8.3)
PROT UR STRIP-MCNC: ABNORMAL MG/DL
RBC # BLD AUTO: 3.33 M/UL (ref 4.1–5.7)
RBC #/AREA URNS HPF: ABNORMAL /HPF (ref 0–5)
RBC MORPH BLD: ABNORMAL
SARS-COV-2 RNA RESP QL NAA+PROBE: NOT DETECTED
SODIUM SERPL-SCNC: 132 MMOL/L (ref 136–145)
SOURCE: NORMAL
SP GR UR REFRACTOMETRY: 1.02
SPECIMEN HOLD: NORMAL
TROPONIN T SERPL HS-MCNC: 21.5 NG/L (ref 0–22)
URINE CULTURE IF INDICATED: ABNORMAL
UROBILINOGEN UR QL STRIP.AUTO: 0.2 EU/DL (ref 0.2–1)
WBC # BLD AUTO: 13.5 K/UL (ref 4.1–11.1)
WBC URNS QL MICRO: ABNORMAL /HPF (ref 0–4)

## 2025-08-18 PROCEDURE — 2580000003 HC RX 258

## 2025-08-18 PROCEDURE — 87636 SARSCOV2 & INF A&B AMP PRB: CPT

## 2025-08-18 PROCEDURE — 96374 THER/PROPH/DIAG INJ IV PUSH: CPT

## 2025-08-18 PROCEDURE — 6360000002 HC RX W HCPCS

## 2025-08-18 PROCEDURE — 84145 PROCALCITONIN (PCT): CPT

## 2025-08-18 PROCEDURE — 36415 COLL VENOUS BLD VENIPUNCTURE: CPT

## 2025-08-18 PROCEDURE — 94761 N-INVAS EAR/PLS OXIMETRY MLT: CPT

## 2025-08-18 PROCEDURE — 83605 ASSAY OF LACTIC ACID: CPT

## 2025-08-18 PROCEDURE — 87040 BLOOD CULTURE FOR BACTERIA: CPT

## 2025-08-18 PROCEDURE — 80053 COMPREHEN METABOLIC PANEL: CPT

## 2025-08-18 PROCEDURE — 71045 X-RAY EXAM CHEST 1 VIEW: CPT

## 2025-08-18 PROCEDURE — 85025 COMPLETE CBC W/AUTO DIFF WBC: CPT

## 2025-08-18 PROCEDURE — 6360000004 HC RX CONTRAST MEDICATION

## 2025-08-18 PROCEDURE — 83880 ASSAY OF NATRIURETIC PEPTIDE: CPT

## 2025-08-18 PROCEDURE — 84484 ASSAY OF TROPONIN QUANT: CPT

## 2025-08-18 PROCEDURE — 99285 EMERGENCY DEPT VISIT HI MDM: CPT

## 2025-08-18 PROCEDURE — 71275 CT ANGIOGRAPHY CHEST: CPT

## 2025-08-18 PROCEDURE — 96361 HYDRATE IV INFUSION ADD-ON: CPT

## 2025-08-18 PROCEDURE — 87154 CUL TYP ID BLD PTHGN 6+ TRGT: CPT

## 2025-08-18 PROCEDURE — 81001 URINALYSIS AUTO W/SCOPE: CPT

## 2025-08-18 PROCEDURE — 93005 ELECTROCARDIOGRAM TRACING: CPT | Performed by: STUDENT IN AN ORGANIZED HEALTH CARE EDUCATION/TRAINING PROGRAM

## 2025-08-18 PROCEDURE — 2500000003 HC RX 250 WO HCPCS

## 2025-08-18 PROCEDURE — 1100000000 HC RM PRIVATE

## 2025-08-18 PROCEDURE — 6370000000 HC RX 637 (ALT 250 FOR IP)

## 2025-08-18 RX ORDER — ACETAMINOPHEN 325 MG/1
650 TABLET ORAL
Status: COMPLETED | OUTPATIENT
Start: 2025-08-18 | End: 2025-08-18

## 2025-08-18 RX ORDER — ENOXAPARIN SODIUM 100 MG/ML
40 INJECTION SUBCUTANEOUS DAILY
Status: DISCONTINUED | OUTPATIENT
Start: 2025-08-19 | End: 2025-08-19

## 2025-08-18 RX ORDER — ONDANSETRON 4 MG/1
4 TABLET, ORALLY DISINTEGRATING ORAL EVERY 8 HOURS PRN
Status: DISCONTINUED | OUTPATIENT
Start: 2025-08-18 | End: 2025-08-21 | Stop reason: HOSPADM

## 2025-08-18 RX ORDER — SODIUM CHLORIDE 0.9 % (FLUSH) 0.9 %
5-40 SYRINGE (ML) INJECTION PRN
Status: DISCONTINUED | OUTPATIENT
Start: 2025-08-18 | End: 2025-08-21 | Stop reason: HOSPADM

## 2025-08-18 RX ORDER — 0.9 % SODIUM CHLORIDE 0.9 %
1000 INTRAVENOUS SOLUTION INTRAVENOUS ONCE
Status: COMPLETED | OUTPATIENT
Start: 2025-08-18 | End: 2025-08-18

## 2025-08-18 RX ORDER — SODIUM CHLORIDE 9 MG/ML
INJECTION, SOLUTION INTRAVENOUS PRN
Status: DISCONTINUED | OUTPATIENT
Start: 2025-08-18 | End: 2025-08-21 | Stop reason: HOSPADM

## 2025-08-18 RX ORDER — POTASSIUM CHLORIDE 1500 MG/1
40 TABLET, EXTENDED RELEASE ORAL PRN
Status: DISCONTINUED | OUTPATIENT
Start: 2025-08-18 | End: 2025-08-21 | Stop reason: HOSPADM

## 2025-08-18 RX ORDER — POTASSIUM CHLORIDE 7.45 MG/ML
10 INJECTION INTRAVENOUS PRN
Status: DISCONTINUED | OUTPATIENT
Start: 2025-08-18 | End: 2025-08-21 | Stop reason: HOSPADM

## 2025-08-18 RX ORDER — IBUPROFEN 600 MG/1
600 TABLET, FILM COATED ORAL
Status: COMPLETED | OUTPATIENT
Start: 2025-08-18 | End: 2025-08-18

## 2025-08-18 RX ORDER — SODIUM CHLORIDE 0.9 % (FLUSH) 0.9 %
5-40 SYRINGE (ML) INJECTION EVERY 12 HOURS SCHEDULED
Status: DISCONTINUED | OUTPATIENT
Start: 2025-08-18 | End: 2025-08-21 | Stop reason: HOSPADM

## 2025-08-18 RX ORDER — IOPAMIDOL 755 MG/ML
100 INJECTION, SOLUTION INTRAVASCULAR
Status: COMPLETED | OUTPATIENT
Start: 2025-08-18 | End: 2025-08-18

## 2025-08-18 RX ORDER — ONDANSETRON 2 MG/ML
4 INJECTION INTRAMUSCULAR; INTRAVENOUS EVERY 6 HOURS PRN
Status: DISCONTINUED | OUTPATIENT
Start: 2025-08-18 | End: 2025-08-21 | Stop reason: HOSPADM

## 2025-08-18 RX ORDER — ACETAMINOPHEN 325 MG/1
650 TABLET ORAL EVERY 6 HOURS PRN
Status: DISCONTINUED | OUTPATIENT
Start: 2025-08-18 | End: 2025-08-21 | Stop reason: HOSPADM

## 2025-08-18 RX ORDER — MAGNESIUM SULFATE IN WATER 40 MG/ML
2000 INJECTION, SOLUTION INTRAVENOUS PRN
Status: DISCONTINUED | OUTPATIENT
Start: 2025-08-18 | End: 2025-08-21 | Stop reason: HOSPADM

## 2025-08-18 RX ORDER — ACETAMINOPHEN 650 MG/1
650 SUPPOSITORY RECTAL EVERY 6 HOURS PRN
Status: DISCONTINUED | OUTPATIENT
Start: 2025-08-18 | End: 2025-08-21 | Stop reason: HOSPADM

## 2025-08-18 RX ORDER — SODIUM CHLORIDE, SODIUM LACTATE, POTASSIUM CHLORIDE, AND CALCIUM CHLORIDE .6; .31; .03; .02 G/100ML; G/100ML; G/100ML; G/100ML
1000 INJECTION, SOLUTION INTRAVENOUS
Status: COMPLETED | OUTPATIENT
Start: 2025-08-18 | End: 2025-08-18

## 2025-08-18 RX ORDER — 0.9 % SODIUM CHLORIDE 0.9 %
30 INTRAVENOUS SOLUTION INTRAVENOUS ONCE
Status: DISCONTINUED | OUTPATIENT
Start: 2025-08-18 | End: 2025-08-18

## 2025-08-18 RX ORDER — POLYETHYLENE GLYCOL 3350 17 G/17G
17 POWDER, FOR SOLUTION ORAL DAILY PRN
Status: DISCONTINUED | OUTPATIENT
Start: 2025-08-18 | End: 2025-08-21 | Stop reason: HOSPADM

## 2025-08-18 RX ADMIN — IBUPROFEN 600 MG: 600 TABLET, FILM COATED ORAL at 21:53

## 2025-08-18 RX ADMIN — SODIUM CHLORIDE 1000 ML: 0.9 INJECTION, SOLUTION INTRAVENOUS at 21:07

## 2025-08-18 RX ADMIN — SODIUM CHLORIDE, SODIUM LACTATE, POTASSIUM CHLORIDE, AND CALCIUM CHLORIDE 1000 ML: .6; .31; .03; .02 INJECTION, SOLUTION INTRAVENOUS at 19:09

## 2025-08-18 RX ADMIN — ACETAMINOPHEN 650 MG: 325 TABLET ORAL at 18:50

## 2025-08-18 RX ADMIN — IOPAMIDOL 100 ML: 755 INJECTION, SOLUTION INTRAVENOUS at 21:31

## 2025-08-18 RX ADMIN — WATER 2000 MG: 1 INJECTION INTRAMUSCULAR; INTRAVENOUS; SUBCUTANEOUS at 21:07

## 2025-08-18 ASSESSMENT — PAIN - FUNCTIONAL ASSESSMENT
PAIN_FUNCTIONAL_ASSESSMENT: 0-10

## 2025-08-18 ASSESSMENT — PAIN DESCRIPTION - LOCATION
LOCATION: HEAD

## 2025-08-18 ASSESSMENT — LIFESTYLE VARIABLES
HOW MANY STANDARD DRINKS CONTAINING ALCOHOL DO YOU HAVE ON A TYPICAL DAY: PATIENT DOES NOT DRINK
HOW OFTEN DO YOU HAVE A DRINK CONTAINING ALCOHOL: MONTHLY OR LESS

## 2025-08-18 ASSESSMENT — PAIN SCALES - GENERAL
PAINLEVEL_OUTOF10: 5
PAINLEVEL_OUTOF10: 4
PAINLEVEL_OUTOF10: 5
PAINLEVEL_OUTOF10: 5
PAINLEVEL_OUTOF10: 4

## 2025-08-19 ENCOUNTER — APPOINTMENT (OUTPATIENT)
Facility: HOSPITAL | Age: 75
End: 2025-08-19
Payer: MEDICARE

## 2025-08-19 ENCOUNTER — APPOINTMENT (OUTPATIENT)
Facility: HOSPITAL | Age: 75
End: 2025-08-19
Attending: INTERNAL MEDICINE
Payer: MEDICARE

## 2025-08-19 LAB
ACB COMPLEX DNA BLD POS QL NAA+NON-PROBE: NOT DETECTED
ACCESSION NUMBER, LLC1M: ABNORMAL
ALBUMIN SERPL-MCNC: 3.2 G/DL (ref 3.5–5.2)
ALBUMIN/GLOB SERPL: 1.1 (ref 1.1–2.2)
ALP SERPL-CCNC: 43 U/L (ref 40–129)
ALT SERPL-CCNC: 20 U/L (ref 10–50)
ANION GAP SERPL CALC-SCNC: 12 MMOL/L (ref 2–14)
AST SERPL-CCNC: 30 U/L (ref 10–50)
B FRAGILIS DNA BLD POS QL NAA+NON-PROBE: NOT DETECTED
BASOPHILS # BLD: 0 K/UL (ref 0–0.1)
BASOPHILS NFR BLD: 0 % (ref 0–1)
BILIRUB SERPL-MCNC: 0.4 MG/DL (ref 0–1.2)
BIOFIRE TEST COMMENT: ABNORMAL
BUN SERPL-MCNC: 21 MG/DL (ref 8–23)
BUN/CREAT SERPL: 17 (ref 12–20)
C ALBICANS DNA BLD POS QL NAA+NON-PROBE: NOT DETECTED
C AURIS DNA BLD POS QL NAA+NON-PROBE: NOT DETECTED
C GATTII+NEOFOR DNA BLD POS QL NAA+N-PRB: NOT DETECTED
C GLABRATA DNA BLD POS QL NAA+NON-PROBE: NOT DETECTED
C KRUSEI DNA BLD POS QL NAA+NON-PROBE: NOT DETECTED
C PARAP DNA BLD POS QL NAA+NON-PROBE: NOT DETECTED
C TROPICLS DNA BLD POS QL NAA+NON-PROBE: NOT DETECTED
CALCIUM SERPL-MCNC: 9.7 MG/DL (ref 8.8–10.2)
CHLORIDE SERPL-SCNC: 99 MMOL/L (ref 98–107)
CO2 SERPL-SCNC: 21 MMOL/L (ref 20–29)
CREAT SERPL-MCNC: 1.26 MG/DL (ref 0.7–1.2)
DIFFERENTIAL METHOD BLD: ABNORMAL
E CLOAC COMP DNA BLD POS NAA+NON-PROBE: NOT DETECTED
E COLI DNA BLD POS QL NAA+NON-PROBE: NOT DETECTED
E FAECALIS DNA BLD POS QL NAA+NON-PROBE: NOT DETECTED
E FAECIUM DNA BLD POS QL NAA+NON-PROBE: NOT DETECTED
ECHO AO ASC DIAM: 3.3 CM
ECHO AO ASCENDING AORTA INDEX: 1.67 CM/M2
ECHO AO ROOT DIAM: 3.2 CM
ECHO AO ROOT INDEX: 1.62 CM/M2
ECHO AV AREA PEAK VELOCITY: 1.2 CM2
ECHO AV AREA VTI: 1.9 CM2
ECHO AV AREA/BSA PEAK VELOCITY: 0.6 CM2/M2
ECHO AV AREA/BSA VTI: 1 CM2/M2
ECHO AV CUSP MM: 1.4 CM
ECHO AV MEAN GRADIENT: 5 MMHG
ECHO AV MEAN VELOCITY: 1 M/S
ECHO AV PEAK GRADIENT: 12 MMHG
ECHO AV PEAK VELOCITY: 1.7 M/S
ECHO AV VELOCITY RATIO: 0.35
ECHO AV VTI: 26.3 CM
ECHO BSA: 2.02 M2
ECHO EST RA PRESSURE: 15 MMHG
ECHO LA DIAMETER INDEX: 2.07 CM/M2
ECHO LA DIAMETER: 4.1 CM
ECHO LA TO AORTIC ROOT RATIO: 1.28
ECHO LA VOL A-L A2C: 122 ML (ref 18–58)
ECHO LA VOL A-L A4C: 72 ML (ref 18–58)
ECHO LA VOL MOD A2C: 119 ML (ref 18–58)
ECHO LA VOL MOD A4C: 65 ML (ref 18–58)
ECHO LA VOLUME AREA LENGTH: 95 ML
ECHO LA VOLUME INDEX A-L A2C: 62 ML/M2 (ref 16–34)
ECHO LA VOLUME INDEX A-L A4C: 36 ML/M2 (ref 16–34)
ECHO LA VOLUME INDEX AREA LENGTH: 48 ML/M2 (ref 16–34)
ECHO LA VOLUME INDEX MOD A2C: 60 ML/M2 (ref 16–34)
ECHO LA VOLUME INDEX MOD A4C: 33 ML/M2 (ref 16–34)
ECHO LV E' LATERAL VELOCITY: 5.53 CM/S
ECHO LV E' SEPTAL VELOCITY: 6.33 CM/S
ECHO LV EF PHYSICIAN: 55 %
ECHO LV FRACTIONAL SHORTENING: 23 % (ref 28–44)
ECHO LV INTERNAL DIMENSION DIASTOLE INDEX: 2.68 CM/M2
ECHO LV INTERNAL DIMENSION DIASTOLIC: 5.3 CM (ref 4.2–5.9)
ECHO LV INTERNAL DIMENSION SYSTOLIC INDEX: 2.07 CM/M2
ECHO LV INTERNAL DIMENSION SYSTOLIC: 4.1 CM
ECHO LV IVSD: 1 CM (ref 0.6–1)
ECHO LV MASS 2D: 200.4 G (ref 88–224)
ECHO LV MASS INDEX 2D: 101.2 G/M2 (ref 49–115)
ECHO LV POSTERIOR WALL DIASTOLIC: 1 CM (ref 0.6–1)
ECHO LV RELATIVE WALL THICKNESS RATIO: 0.38
ECHO LVOT AREA: 3.8 CM2
ECHO LVOT AV VTI INDEX: 0.5
ECHO LVOT DIAM: 2.2 CM
ECHO LVOT MEAN GRADIENT: 1 MMHG
ECHO LVOT PEAK GRADIENT: 1 MMHG
ECHO LVOT PEAK VELOCITY: 0.6 M/S
ECHO LVOT STROKE VOLUME INDEX: 25.3 ML/M2
ECHO LVOT SV: 50.2 ML
ECHO LVOT VTI: 13.2 CM
ECHO MV A VELOCITY: 0.46 M/S
ECHO MV E DECELERATION TIME (DT): 217.2 MS
ECHO MV E VELOCITY: 0.91 M/S
ECHO MV E/A RATIO: 1.98
ECHO MV E/E' LATERAL: 16.46
ECHO MV E/E' RATIO (AVERAGED): 15.42
ECHO MV E/E' SEPTAL: 14.38
ECHO MV REGURGITANT PEAK GRADIENT: 21 MMHG
ECHO MV REGURGITANT PEAK VELOCITY: 2.3 M/S
ECHO PV MAX VELOCITY: 0.9 M/S
ECHO PV PEAK GRADIENT: 3 MMHG
ECHO PVEIN A DURATION: 95.2 MS
ECHO PVEIN A VELOCITY: 0.3 M/S
ECHO PVEIN PEAK D VELOCITY: 0.7 M/S
ECHO PVEIN PEAK S VELOCITY: 0.7 M/S
ECHO PVEIN S/D RATIO: 1
ECHO RIGHT VENTRICULAR SYSTOLIC PRESSURE (RVSP): 36 MMHG
ECHO RV FREE WALL PEAK S': 12.8 CM/S
ECHO RV INTERNAL DIMENSION: 3.2 CM
ECHO RV TAPSE: 2.1 CM (ref 1.7–?)
ECHO TV REGURGITANT MAX VELOCITY: 2.31 M/S
ECHO TV REGURGITANT PEAK GRADIENT: 21 MMHG
ENTEROBACTERALES DNA BLD POS NAA+N-PRB: NOT DETECTED
EOSINOPHIL # BLD: 0.14 K/UL (ref 0–0.4)
EOSINOPHIL NFR BLD: 1 % (ref 0–7)
ERYTHROCYTE [DISTWIDTH] IN BLOOD BY AUTOMATED COUNT: 12.7 % (ref 11.5–14.5)
GLOBULIN SER CALC-MCNC: 3 G/DL (ref 2–4)
GLUCOSE SERPL-MCNC: 90 MG/DL (ref 65–100)
GP B STREP DNA BLD POS QL NAA+NON-PROBE: NOT DETECTED
HAEM INFLU DNA BLD POS QL NAA+NON-PROBE: NOT DETECTED
HCT VFR BLD AUTO: 32.7 % (ref 36.6–50.3)
HGB BLD-MCNC: 10.9 G/DL (ref 12.1–17)
IMM GRANULOCYTES # BLD AUTO: 0 K/UL (ref 0–0.04)
IMM GRANULOCYTES NFR BLD AUTO: 0 % (ref 0–0.5)
K OXYTOCA DNA BLD POS QL NAA+NON-PROBE: NOT DETECTED
KLEBSIELLA SP DNA BLD POS QL NAA+NON-PRB: NOT DETECTED
KLEBSIELLA SP DNA BLD POS QL NAA+NON-PRB: NOT DETECTED
L MONOCYTOG DNA BLD POS QL NAA+NON-PROBE: NOT DETECTED
LACTATE BLD-SCNC: 1.4 MMOL/L (ref 0.4–2)
LYMPHOCYTES # BLD: 0.43 K/UL (ref 0.8–3.5)
LYMPHOCYTES NFR BLD: 3 % (ref 12–49)
MCH RBC QN AUTO: 35.2 PG (ref 26–34)
MCHC RBC AUTO-ENTMCNC: 33.3 G/DL (ref 30–36.5)
MCV RBC AUTO: 105.5 FL (ref 80–99)
MECA+MECC ISLT/SPM QL: NOT DETECTED
MONOCYTES # BLD: 1 K/UL (ref 0–1)
MONOCYTES NFR BLD: 7 % (ref 5–13)
N MEN DNA BLD POS QL NAA+NON-PROBE: NOT DETECTED
NEUTS SEG # BLD: 12.73 K/UL (ref 1.8–8)
NEUTS SEG NFR BLD: 89 % (ref 32–75)
NRBC # BLD: 0 K/UL (ref 0–0.01)
NRBC BLD-RTO: 0 PER 100 WBC
P AERUGINOSA DNA BLD POS NAA+NON-PROBE: NOT DETECTED
PLATELET # BLD AUTO: 156 K/UL (ref 150–400)
PMV BLD AUTO: 8.9 FL (ref 8.9–12.9)
POTASSIUM SERPL-SCNC: 4.2 MMOL/L (ref 3.5–5.1)
PROT SERPL-MCNC: 6.2 G/DL (ref 6.4–8.3)
PROTEUS SP DNA BLD POS QL NAA+NON-PROBE: NOT DETECTED
RBC # BLD AUTO: 3.1 M/UL (ref 4.1–5.7)
RBC MORPH BLD: ABNORMAL
RESISTANT GENE TARGETS: ABNORMAL
S AUREUS DNA BLD POS QL NAA+NON-PROBE: NOT DETECTED
S AUREUS+CONS DNA BLD POS NAA+NON-PROBE: DETECTED
S EPIDERMIDIS DNA BLD POS QL NAA+NON-PRB: DETECTED
S LUGDUNENSIS DNA BLD POS QL NAA+NON-PRB: NOT DETECTED
S MALTOPHILIA DNA BLD POS QL NAA+NON-PRB: NOT DETECTED
S MARCESCENS DNA BLD POS NAA+NON-PROBE: NOT DETECTED
S PNEUM DNA BLD POS QL NAA+NON-PROBE: NOT DETECTED
S PYO DNA BLD POS QL NAA+NON-PROBE: NOT DETECTED
SALMONELLA DNA BLD POS QL NAA+NON-PROBE: NOT DETECTED
SODIUM SERPL-SCNC: 132 MMOL/L (ref 136–145)
STREPTOCOCCUS DNA BLD POS NAA+NON-PROBE: NOT DETECTED
TROPONIN T SERPL HS-MCNC: 23.9 NG/L (ref 0–22)
TROPONIN T SERPL HS-MCNC: 26.3 NG/L (ref 0–22)
WBC # BLD AUTO: 14.3 K/UL (ref 4.1–11.1)

## 2025-08-19 PROCEDURE — 2580000003 HC RX 258: Performed by: EMERGENCY MEDICINE

## 2025-08-19 PROCEDURE — 6370000000 HC RX 637 (ALT 250 FOR IP): Performed by: FAMILY MEDICINE

## 2025-08-19 PROCEDURE — 1100000003 HC PRIVATE W/ TELEMETRY

## 2025-08-19 PROCEDURE — 97116 GAIT TRAINING THERAPY: CPT

## 2025-08-19 PROCEDURE — 2580000003 HC RX 258: Performed by: INTERNAL MEDICINE

## 2025-08-19 PROCEDURE — 97535 SELF CARE MNGMENT TRAINING: CPT

## 2025-08-19 PROCEDURE — 97165 OT EVAL LOW COMPLEX 30 MIN: CPT

## 2025-08-19 PROCEDURE — 6360000002 HC RX W HCPCS: Performed by: INTERNAL MEDICINE

## 2025-08-19 PROCEDURE — 80053 COMPREHEN METABOLIC PANEL: CPT

## 2025-08-19 PROCEDURE — 84484 ASSAY OF TROPONIN QUANT: CPT

## 2025-08-19 PROCEDURE — 2500000003 HC RX 250 WO HCPCS: Performed by: FAMILY MEDICINE

## 2025-08-19 PROCEDURE — 85025 COMPLETE CBC W/AUTO DIFF WBC: CPT

## 2025-08-19 PROCEDURE — 97163 PT EVAL HIGH COMPLEX 45 MIN: CPT

## 2025-08-19 PROCEDURE — 70450 CT HEAD/BRAIN W/O DYE: CPT

## 2025-08-19 PROCEDURE — 36415 COLL VENOUS BLD VENIPUNCTURE: CPT

## 2025-08-19 PROCEDURE — 6360000002 HC RX W HCPCS: Performed by: FAMILY MEDICINE

## 2025-08-19 PROCEDURE — 83605 ASSAY OF LACTIC ACID: CPT

## 2025-08-19 PROCEDURE — 6370000000 HC RX 637 (ALT 250 FOR IP): Performed by: INTERNAL MEDICINE

## 2025-08-19 PROCEDURE — 2580000003 HC RX 258: Performed by: FAMILY MEDICINE

## 2025-08-19 PROCEDURE — 93306 TTE W/DOPPLER COMPLETE: CPT

## 2025-08-19 RX ORDER — 0.9 % SODIUM CHLORIDE 0.9 %
1000 INTRAVENOUS SOLUTION INTRAVENOUS ONCE
Status: COMPLETED | OUTPATIENT
Start: 2025-08-19 | End: 2025-08-19

## 2025-08-19 RX ORDER — SODIUM CHLORIDE 9 MG/ML
INJECTION, SOLUTION INTRAVENOUS CONTINUOUS
Status: ACTIVE | OUTPATIENT
Start: 2025-08-19 | End: 2025-08-20

## 2025-08-19 RX ORDER — SODIUM CHLORIDE, SODIUM LACTATE, POTASSIUM CHLORIDE, CALCIUM CHLORIDE 600; 310; 30; 20 MG/100ML; MG/100ML; MG/100ML; MG/100ML
INJECTION, SOLUTION INTRAVENOUS CONTINUOUS
Status: ACTIVE | OUTPATIENT
Start: 2025-08-19 | End: 2025-08-19

## 2025-08-19 RX ADMIN — ACETAMINOPHEN 650 MG: 325 TABLET ORAL at 05:08

## 2025-08-19 RX ADMIN — WATER 1000 MG: 1 INJECTION INTRAMUSCULAR; INTRAVENOUS; SUBCUTANEOUS at 08:30

## 2025-08-19 RX ADMIN — RIVAROXABAN 15 MG: 15 TABLET, FILM COATED ORAL at 17:10

## 2025-08-19 RX ADMIN — VANCOMYCIN HYDROCHLORIDE 2000 MG: 10 INJECTION, POWDER, LYOPHILIZED, FOR SOLUTION INTRAVENOUS at 23:32

## 2025-08-19 RX ADMIN — SODIUM CHLORIDE, PRESERVATIVE FREE 10 ML: 5 INJECTION INTRAVENOUS at 20:58

## 2025-08-19 RX ADMIN — ACETAMINOPHEN 650 MG: 325 TABLET ORAL at 12:56

## 2025-08-19 RX ADMIN — SODIUM CHLORIDE, PRESERVATIVE FREE 10 ML: 5 INJECTION INTRAVENOUS at 08:33

## 2025-08-19 RX ADMIN — SODIUM CHLORIDE, SODIUM LACTATE, POTASSIUM CHLORIDE, AND CALCIUM CHLORIDE: .6; .31; .03; .02 INJECTION, SOLUTION INTRAVENOUS at 04:55

## 2025-08-19 RX ADMIN — SODIUM CHLORIDE 1000 ML: 0.9 INJECTION, SOLUTION INTRAVENOUS at 00:10

## 2025-08-19 RX ADMIN — SODIUM CHLORIDE, PRESERVATIVE FREE 10 ML: 5 INJECTION INTRAVENOUS at 04:30

## 2025-08-19 RX ADMIN — ACETAMINOPHEN 650 MG: 325 TABLET ORAL at 20:57

## 2025-08-19 RX ADMIN — ENOXAPARIN SODIUM 40 MG: 100 INJECTION SUBCUTANEOUS at 08:33

## 2025-08-19 RX ADMIN — SODIUM CHLORIDE: 0.9 INJECTION, SOLUTION INTRAVENOUS at 12:26

## 2025-08-19 ASSESSMENT — PAIN SCALES - GENERAL
PAINLEVEL_OUTOF10: 3
PAINLEVEL_OUTOF10: 0
PAINLEVEL_OUTOF10: 0
PAINLEVEL_OUTOF10: 2
PAINLEVEL_OUTOF10: 6
PAINLEVEL_OUTOF10: 0
PAINLEVEL_OUTOF10: 3
PAINLEVEL_OUTOF10: 0

## 2025-08-19 ASSESSMENT — PAIN - FUNCTIONAL ASSESSMENT
PAIN_FUNCTIONAL_ASSESSMENT: 0-10
PAIN_FUNCTIONAL_ASSESSMENT: ACTIVITIES ARE NOT PREVENTED
PAIN_FUNCTIONAL_ASSESSMENT: ACTIVITIES ARE NOT PREVENTED
PAIN_FUNCTIONAL_ASSESSMENT: 0-10
PAIN_FUNCTIONAL_ASSESSMENT: 0-10
PAIN_FUNCTIONAL_ASSESSMENT: ACTIVITIES ARE NOT PREVENTED

## 2025-08-19 ASSESSMENT — PAIN DESCRIPTION - ORIENTATION
ORIENTATION: MID
ORIENTATION: RIGHT;LEFT

## 2025-08-19 ASSESSMENT — PAIN DESCRIPTION - DESCRIPTORS
DESCRIPTORS: ACHING

## 2025-08-19 ASSESSMENT — PAIN DESCRIPTION - ONSET: ONSET: ON-GOING

## 2025-08-19 ASSESSMENT — PAIN DESCRIPTION - LOCATION
LOCATION: BACK
LOCATION: HEAD;ARM
LOCATION: BACK
LOCATION: BACK

## 2025-08-19 ASSESSMENT — PAIN DESCRIPTION - PAIN TYPE: TYPE: CHRONIC PAIN

## 2025-08-19 ASSESSMENT — PAIN DESCRIPTION - FREQUENCY: FREQUENCY: INTERMITTENT

## 2025-08-20 LAB
ALBUMIN SERPL-MCNC: 3.1 G/DL (ref 3.5–5.2)
ALBUMIN/GLOB SERPL: 1 (ref 1.1–2.2)
ALP SERPL-CCNC: 49 U/L (ref 40–129)
ALT SERPL-CCNC: 28 U/L (ref 10–50)
ANION GAP SERPL CALC-SCNC: 12 MMOL/L (ref 2–14)
AST SERPL-CCNC: 48 U/L (ref 10–50)
BASOPHILS # BLD: 0.03 K/UL (ref 0–0.1)
BASOPHILS NFR BLD: 0.3 % (ref 0–1)
BILIRUB SERPL-MCNC: 0.4 MG/DL (ref 0–1.2)
BUN SERPL-MCNC: 20 MG/DL (ref 8–23)
BUN/CREAT SERPL: 18 (ref 12–20)
CALCIUM SERPL-MCNC: 9.3 MG/DL (ref 8.8–10.2)
CHLORIDE SERPL-SCNC: 102 MMOL/L (ref 98–107)
CO2 SERPL-SCNC: 19 MMOL/L (ref 20–29)
CREAT SERPL-MCNC: 1.08 MG/DL (ref 0.7–1.2)
DIFFERENTIAL METHOD BLD: ABNORMAL
EOSINOPHIL # BLD: 0.04 K/UL (ref 0–0.4)
EOSINOPHIL NFR BLD: 0.4 % (ref 0–7)
ERYTHROCYTE [DISTWIDTH] IN BLOOD BY AUTOMATED COUNT: 12.6 % (ref 11.5–14.5)
GLOBULIN SER CALC-MCNC: 3.2 G/DL (ref 2–4)
GLUCOSE SERPL-MCNC: 85 MG/DL (ref 65–100)
HCT VFR BLD AUTO: 33.9 % (ref 36.6–50.3)
HGB BLD-MCNC: 10.9 G/DL (ref 12.1–17)
IMM GRANULOCYTES # BLD AUTO: 0.08 K/UL (ref 0–0.04)
IMM GRANULOCYTES NFR BLD AUTO: 0.8 % (ref 0–0.5)
LYMPHOCYTES # BLD: 0.96 K/UL (ref 0.8–3.5)
LYMPHOCYTES NFR BLD: 9.8 % (ref 12–49)
MAGNESIUM SERPL-MCNC: 1.6 MG/DL (ref 1.6–2.4)
MCH RBC QN AUTO: 35 PG (ref 26–34)
MCHC RBC AUTO-ENTMCNC: 32.2 G/DL (ref 30–36.5)
MCV RBC AUTO: 109 FL (ref 80–99)
MONOCYTES # BLD: 0.58 K/UL (ref 0–1)
MONOCYTES NFR BLD: 5.9 % (ref 5–13)
NEUTS SEG # BLD: 8.11 K/UL (ref 1.8–8)
NEUTS SEG NFR BLD: 82.8 % (ref 32–75)
NRBC # BLD: 0 K/UL (ref 0–0.01)
NRBC BLD-RTO: 0 PER 100 WBC
PLATELET # BLD AUTO: 155 K/UL (ref 150–400)
PMV BLD AUTO: 9.2 FL (ref 8.9–12.9)
POTASSIUM SERPL-SCNC: 4.1 MMOL/L (ref 3.5–5.1)
PROCALCITONIN SERPL-MCNC: 2.61 NG/ML
PROT SERPL-MCNC: 6.3 G/DL (ref 6.4–8.3)
RBC # BLD AUTO: 3.11 M/UL (ref 4.1–5.7)
RBC MORPH BLD: ABNORMAL
SODIUM SERPL-SCNC: 133 MMOL/L (ref 136–145)
WBC # BLD AUTO: 9.8 K/UL (ref 4.1–11.1)

## 2025-08-20 PROCEDURE — 6370000000 HC RX 637 (ALT 250 FOR IP): Performed by: INTERNAL MEDICINE

## 2025-08-20 PROCEDURE — 87040 BLOOD CULTURE FOR BACTERIA: CPT

## 2025-08-20 PROCEDURE — 6370000000 HC RX 637 (ALT 250 FOR IP): Performed by: FAMILY MEDICINE

## 2025-08-20 PROCEDURE — 85025 COMPLETE CBC W/AUTO DIFF WBC: CPT

## 2025-08-20 PROCEDURE — 36415 COLL VENOUS BLD VENIPUNCTURE: CPT

## 2025-08-20 PROCEDURE — 1100000000 HC RM PRIVATE

## 2025-08-20 PROCEDURE — 2500000003 HC RX 250 WO HCPCS: Performed by: FAMILY MEDICINE

## 2025-08-20 PROCEDURE — 83735 ASSAY OF MAGNESIUM: CPT

## 2025-08-20 PROCEDURE — 84145 PROCALCITONIN (PCT): CPT

## 2025-08-20 PROCEDURE — 80053 COMPREHEN METABOLIC PANEL: CPT

## 2025-08-20 PROCEDURE — 6360000002 HC RX W HCPCS: Performed by: FAMILY MEDICINE

## 2025-08-20 RX ORDER — ATORVASTATIN CALCIUM 20 MG/1
20 TABLET, FILM COATED ORAL DAILY
Status: DISCONTINUED | OUTPATIENT
Start: 2025-08-20 | End: 2025-08-21 | Stop reason: HOSPADM

## 2025-08-20 RX ORDER — TERBINAFINE HYDROCHLORIDE 250 MG/1
250 TABLET ORAL DAILY
Status: DISCONTINUED | OUTPATIENT
Start: 2025-08-20 | End: 2025-08-21 | Stop reason: HOSPADM

## 2025-08-20 RX ORDER — METOPROLOL SUCCINATE 100 MG/1
TABLET, EXTENDED RELEASE ORAL
COMMUNITY

## 2025-08-20 RX ORDER — TERBINAFINE HYDROCHLORIDE 250 MG/1
TABLET ORAL
COMMUNITY
Start: 2025-08-18

## 2025-08-20 RX ORDER — METOPROLOL SUCCINATE 50 MG/1
50 TABLET, EXTENDED RELEASE ORAL DAILY
Status: DISCONTINUED | OUTPATIENT
Start: 2025-08-20 | End: 2025-08-21 | Stop reason: HOSPADM

## 2025-08-20 RX ADMIN — METOPROLOL SUCCINATE 50 MG: 50 TABLET, EXTENDED RELEASE ORAL at 10:06

## 2025-08-20 RX ADMIN — WATER 1000 MG: 1 INJECTION INTRAMUSCULAR; INTRAVENOUS; SUBCUTANEOUS at 08:25

## 2025-08-20 RX ADMIN — ATORVASTATIN CALCIUM 20 MG: 20 TABLET, FILM COATED ORAL at 10:06

## 2025-08-20 RX ADMIN — TERBINAFINE 250 MG: 250 TABLET ORAL at 10:06

## 2025-08-20 RX ADMIN — ACETAMINOPHEN 650 MG: 325 TABLET ORAL at 10:06

## 2025-08-20 RX ADMIN — RIVAROXABAN 20 MG: 20 TABLET, FILM COATED ORAL at 16:53

## 2025-08-20 RX ADMIN — SODIUM CHLORIDE, PRESERVATIVE FREE 10 ML: 5 INJECTION INTRAVENOUS at 08:30

## 2025-08-20 RX ADMIN — ACETAMINOPHEN 650 MG: 325 TABLET ORAL at 21:51

## 2025-08-20 ASSESSMENT — PAIN DESCRIPTION - FREQUENCY: FREQUENCY: INTERMITTENT

## 2025-08-20 ASSESSMENT — PAIN SCALES - GENERAL
PAINLEVEL_OUTOF10: 7
PAINLEVEL_OUTOF10: 0
PAINLEVEL_OUTOF10: 1
PAINLEVEL_OUTOF10: 0
PAINLEVEL_OUTOF10: 3

## 2025-08-20 ASSESSMENT — PAIN - FUNCTIONAL ASSESSMENT
PAIN_FUNCTIONAL_ASSESSMENT: 0-10

## 2025-08-20 ASSESSMENT — PAIN DESCRIPTION - DESCRIPTORS
DESCRIPTORS: ACHING

## 2025-08-20 ASSESSMENT — PAIN DESCRIPTION - LOCATION
LOCATION: BACK

## 2025-08-20 ASSESSMENT — PAIN DESCRIPTION - ORIENTATION: ORIENTATION: RIGHT;LEFT;POSTERIOR

## 2025-08-21 ENCOUNTER — APPOINTMENT (OUTPATIENT)
Facility: HOSPITAL | Age: 75
End: 2025-08-21
Payer: MEDICARE

## 2025-08-21 VITALS
SYSTOLIC BLOOD PRESSURE: 139 MMHG | HEIGHT: 67 IN | RESPIRATION RATE: 18 BRPM | OXYGEN SATURATION: 99 % | BODY MASS INDEX: 29.82 KG/M2 | WEIGHT: 190 LBS | HEART RATE: 77 BPM | DIASTOLIC BLOOD PRESSURE: 80 MMHG | TEMPERATURE: 98.4 F

## 2025-08-21 LAB
ALBUMIN SERPL-MCNC: 2.9 G/DL (ref 3.5–5.2)
ALBUMIN/GLOB SERPL: 1 (ref 1.1–2.2)
ALP SERPL-CCNC: 50 U/L (ref 40–129)
ALT SERPL-CCNC: 23 U/L (ref 10–50)
ANION GAP SERPL CALC-SCNC: 12 MMOL/L (ref 2–14)
AST SERPL-CCNC: 40 U/L (ref 10–50)
BASOPHILS # BLD: 0.02 K/UL (ref 0–0.1)
BASOPHILS NFR BLD: 0.2 % (ref 0–1)
BILIRUB SERPL-MCNC: 0.3 MG/DL (ref 0–1.2)
BUN SERPL-MCNC: 23 MG/DL (ref 8–23)
BUN/CREAT SERPL: 22 (ref 12–20)
CALCIUM SERPL-MCNC: 9 MG/DL (ref 8.8–10.2)
CHLORIDE SERPL-SCNC: 102 MMOL/L (ref 98–107)
CO2 SERPL-SCNC: 20 MMOL/L (ref 20–29)
CREAT SERPL-MCNC: 1.03 MG/DL (ref 0.7–1.2)
DIFFERENTIAL METHOD BLD: ABNORMAL
ECHO BSA: 2.02 M2
EOSINOPHIL # BLD: 0.18 K/UL (ref 0–0.4)
EOSINOPHIL NFR BLD: 2.1 % (ref 0–7)
ERYTHROCYTE [DISTWIDTH] IN BLOOD BY AUTOMATED COUNT: 12.4 % (ref 11.5–14.5)
GLOBULIN SER CALC-MCNC: 2.9 G/DL (ref 2–4)
GLUCOSE SERPL-MCNC: 95 MG/DL (ref 65–100)
HCT VFR BLD AUTO: 32.9 % (ref 36.6–50.3)
HGB BLD-MCNC: 10.5 G/DL (ref 12.1–17)
IMM GRANULOCYTES # BLD AUTO: 0.06 K/UL (ref 0–0.04)
IMM GRANULOCYTES NFR BLD AUTO: 0.7 % (ref 0–0.5)
LYMPHOCYTES # BLD: 0.78 K/UL (ref 0.8–3.5)
LYMPHOCYTES NFR BLD: 8.9 % (ref 12–49)
MCH RBC QN AUTO: 35.5 PG (ref 26–34)
MCHC RBC AUTO-ENTMCNC: 31.9 G/DL (ref 30–36.5)
MCV RBC AUTO: 111.1 FL (ref 80–99)
MONOCYTES # BLD: 0.95 K/UL (ref 0–1)
MONOCYTES NFR BLD: 10.8 % (ref 5–13)
NEUTS SEG # BLD: 6.81 K/UL (ref 1.8–8)
NEUTS SEG NFR BLD: 77.3 % (ref 32–75)
NRBC # BLD: 0 K/UL (ref 0–0.01)
NRBC BLD-RTO: 0 PER 100 WBC
PLATELET # BLD AUTO: 161 K/UL (ref 150–400)
PMV BLD AUTO: 9.1 FL (ref 8.9–12.9)
POTASSIUM SERPL-SCNC: 4 MMOL/L (ref 3.5–5.1)
PROCALCITONIN SERPL-MCNC: 1.17 NG/ML
PROT SERPL-MCNC: 5.8 G/DL (ref 6.4–8.3)
RBC # BLD AUTO: 2.96 M/UL (ref 4.1–5.7)
RBC MORPH BLD: ABNORMAL
SODIUM SERPL-SCNC: 134 MMOL/L (ref 136–145)
WBC # BLD AUTO: 8.8 K/UL (ref 4.1–11.1)

## 2025-08-21 PROCEDURE — 80053 COMPREHEN METABOLIC PANEL: CPT

## 2025-08-21 PROCEDURE — 6370000000 HC RX 637 (ALT 250 FOR IP): Performed by: FAMILY MEDICINE

## 2025-08-21 PROCEDURE — 6360000002 HC RX W HCPCS: Performed by: FAMILY MEDICINE

## 2025-08-21 PROCEDURE — 2500000003 HC RX 250 WO HCPCS: Performed by: FAMILY MEDICINE

## 2025-08-21 PROCEDURE — 6370000000 HC RX 637 (ALT 250 FOR IP): Performed by: INTERNAL MEDICINE

## 2025-08-21 PROCEDURE — 85025 COMPLETE CBC W/AUTO DIFF WBC: CPT

## 2025-08-21 PROCEDURE — 84145 PROCALCITONIN (PCT): CPT

## 2025-08-21 PROCEDURE — 93971 EXTREMITY STUDY: CPT

## 2025-08-21 PROCEDURE — 36415 COLL VENOUS BLD VENIPUNCTURE: CPT

## 2025-08-21 RX ADMIN — SODIUM CHLORIDE, PRESERVATIVE FREE 10 ML: 5 INJECTION INTRAVENOUS at 09:15

## 2025-08-21 RX ADMIN — TERBINAFINE 250 MG: 250 TABLET ORAL at 09:15

## 2025-08-21 RX ADMIN — ATORVASTATIN CALCIUM 20 MG: 20 TABLET, FILM COATED ORAL at 09:15

## 2025-08-21 RX ADMIN — ACETAMINOPHEN 650 MG: 325 TABLET ORAL at 06:26

## 2025-08-21 RX ADMIN — WATER 1000 MG: 1 INJECTION INTRAMUSCULAR; INTRAVENOUS; SUBCUTANEOUS at 09:15

## 2025-08-21 RX ADMIN — POLYETHYLENE GLYCOL 3350 17 G: 17 POWDER, FOR SOLUTION ORAL at 12:51

## 2025-08-21 RX ADMIN — METOPROLOL SUCCINATE 50 MG: 50 TABLET, EXTENDED RELEASE ORAL at 09:15

## 2025-08-21 ASSESSMENT — PAIN SCALES - GENERAL
PAINLEVEL_OUTOF10: 4
PAINLEVEL_OUTOF10: 3

## 2025-08-21 ASSESSMENT — PAIN DESCRIPTION - LOCATION: LOCATION: BACK

## 2025-08-21 ASSESSMENT — PAIN DESCRIPTION - ORIENTATION: ORIENTATION: LOWER;MID

## 2025-08-21 ASSESSMENT — PAIN - FUNCTIONAL ASSESSMENT: PAIN_FUNCTIONAL_ASSESSMENT: 0-10

## 2025-08-21 ASSESSMENT — PAIN DESCRIPTION - DESCRIPTORS: DESCRIPTORS: ACHING;DISCOMFORT

## 2025-08-23 LAB
BACTERIA SPEC CULT: ABNORMAL
BACTERIA SPEC CULT: ABNORMAL
SERVICE CMNT-IMP: ABNORMAL

## 2025-08-24 LAB
BACTERIA SPEC CULT: NORMAL
SERVICE CMNT-IMP: NORMAL

## 2025-08-26 LAB
BACTERIA SPEC CULT: NORMAL
BACTERIA SPEC CULT: NORMAL
SERVICE CMNT-IMP: NORMAL
SERVICE CMNT-IMP: NORMAL

## (undated) DEVICE — RADIFOCUS OPTITORQUE ANGIOGRAPHIC CATHETER: Brand: OPTITORQUE

## (undated) DEVICE — TR BAND RADIAL ARTERY COMPRESSION DEVICE: Brand: TR BAND

## (undated) DEVICE — SYRINGE ANGIO 10 CC BRL STD PRNT POLYCARB LT BLU MEDALLION

## (undated) DEVICE — TUBING PRSS MON L6IN PVC M FEM CONN

## (undated) DEVICE — GUIDEWIRE VASC L260CM 0.035IN J TIP L3MM PTFE FIX COR NAMIC

## (undated) DEVICE — SPLINT WR POS F/ARTERIAL ACC -- BX/10

## (undated) DEVICE — PACK PROCEDURE SURG HRT CATH

## (undated) DEVICE — HI-TORQUE VERSACORE FLOPPY GUIDE WIRE SYSTEM 145 CM: Brand: HI-TORQUE VERSACORE

## (undated) DEVICE — INTRODUCER SHTH THN WALLED 6 FRX10 CM 22 GA ANGLED RAIN SHTH

## (undated) DEVICE — SYR POWER 150ML 8IN FILL TUBE --

## (undated) DEVICE — CATHETER ETER ANGIO L110CM OD5FR ID046IN L75CM 038IN 145DEG CARD

## (undated) DEVICE — KIT ANGIOGRAPHY CUST MRMC

## (undated) DEVICE — 3M™ TEGADERM™ TRANSPARENT FILM DRESSING FRAME STYLE, 1626W, 4 IN X 4-3/4 IN (10 CM X 12 CM), 50/CT 4CT/CASE: Brand: 3M™ TEGADERM™